# Patient Record
Sex: FEMALE | Race: WHITE | NOT HISPANIC OR LATINO | URBAN - METROPOLITAN AREA
[De-identification: names, ages, dates, MRNs, and addresses within clinical notes are randomized per-mention and may not be internally consistent; named-entity substitution may affect disease eponyms.]

---

## 2017-07-07 ENCOUNTER — INPATIENT (INPATIENT)
Facility: HOSPITAL | Age: 52
LOS: 4 days | Discharge: HOME | End: 2017-07-12
Attending: INTERNAL MEDICINE

## 2017-07-07 DIAGNOSIS — D64.9 ANEMIA, UNSPECIFIED: ICD-10-CM

## 2017-07-16 DIAGNOSIS — K86.2 CYST OF PANCREAS: ICD-10-CM

## 2017-07-16 DIAGNOSIS — K57.90 DIVERTICULOSIS OF INTESTINE, PART UNSPECIFIED, WITHOUT PERFORATION OR ABSCESS WITHOUT BLEEDING: ICD-10-CM

## 2017-07-16 DIAGNOSIS — K64.8 OTHER HEMORRHOIDS: ICD-10-CM

## 2017-07-16 DIAGNOSIS — K20.9 ESOPHAGITIS, UNSPECIFIED: ICD-10-CM

## 2017-07-16 DIAGNOSIS — C78.7 SECONDARY MALIGNANT NEOPLASM OF LIVER AND INTRAHEPATIC BILE DUCT: ICD-10-CM

## 2017-07-16 DIAGNOSIS — C18.0 MALIGNANT NEOPLASM OF CECUM: ICD-10-CM

## 2017-07-16 DIAGNOSIS — D64.9 ANEMIA, UNSPECIFIED: ICD-10-CM

## 2017-07-16 DIAGNOSIS — R50.9 FEVER, UNSPECIFIED: ICD-10-CM

## 2017-07-16 DIAGNOSIS — K21.9 GASTRO-ESOPHAGEAL REFLUX DISEASE WITHOUT ESOPHAGITIS: ICD-10-CM

## 2017-07-16 DIAGNOSIS — Z87.442 PERSONAL HISTORY OF URINARY CALCULI: ICD-10-CM

## 2017-07-16 DIAGNOSIS — K44.9 DIAPHRAGMATIC HERNIA WITHOUT OBSTRUCTION OR GANGRENE: ICD-10-CM

## 2017-07-16 DIAGNOSIS — R51 HEADACHE: ICD-10-CM

## 2017-07-16 DIAGNOSIS — K29.70 GASTRITIS, UNSPECIFIED, WITHOUT BLEEDING: ICD-10-CM

## 2017-07-16 DIAGNOSIS — K64.4 RESIDUAL HEMORRHOIDAL SKIN TAGS: ICD-10-CM

## 2017-07-16 DIAGNOSIS — C77.2 SECONDARY AND UNSPECIFIED MALIGNANT NEOPLASM OF INTRA-ABDOMINAL LYMPH NODES: ICD-10-CM

## 2017-07-16 DIAGNOSIS — Z87.891 PERSONAL HISTORY OF NICOTINE DEPENDENCE: ICD-10-CM

## 2018-01-28 ENCOUNTER — INPATIENT (INPATIENT)
Facility: HOSPITAL | Age: 53
LOS: 11 days | Discharge: HOME | End: 2018-02-09
Attending: INTERNAL MEDICINE

## 2018-01-28 DIAGNOSIS — Z88.2 ALLERGY STATUS TO SULFONAMIDES: ICD-10-CM

## 2018-02-03 VITALS
DIASTOLIC BLOOD PRESSURE: 55 MMHG | HEIGHT: 64 IN | RESPIRATION RATE: 18 BRPM | SYSTOLIC BLOOD PRESSURE: 103 MMHG | HEART RATE: 100 BPM | TEMPERATURE: 101 F | WEIGHT: 262.35 LBS

## 2018-02-03 RX ORDER — ELECTROLYTE SOLUTION,INJ
1 VIAL (ML) INTRAVENOUS
Qty: 0 | Refills: 0 | Status: DISCONTINUED | OUTPATIENT
Start: 2018-02-03 | End: 2018-02-03

## 2018-02-03 RX ORDER — CIPROFLOXACIN LACTATE 400MG/40ML
400 VIAL (ML) INTRAVENOUS EVERY 12 HOURS
Qty: 0 | Refills: 0 | Status: DISCONTINUED | OUTPATIENT
Start: 2018-02-03 | End: 2018-02-05

## 2018-02-03 RX ORDER — I.V. FAT EMULSION 20 G/100ML
0.84 EMULSION INTRAVENOUS
Qty: 100 | Refills: 0 | Status: DISCONTINUED | OUTPATIENT
Start: 2018-02-03 | End: 2018-02-03

## 2018-02-03 RX ORDER — METRONIDAZOLE 500 MG
250 TABLET ORAL EVERY 8 HOURS
Qty: 0 | Refills: 0 | Status: DISCONTINUED | OUTPATIENT
Start: 2018-02-03 | End: 2018-02-05

## 2018-02-03 RX ORDER — LOPERAMIDE HCL 2 MG
2 TABLET ORAL EVERY 6 HOURS
Qty: 0 | Refills: 0 | Status: DISCONTINUED | OUTPATIENT
Start: 2018-02-03 | End: 2018-02-04

## 2018-02-03 RX ORDER — PANTOPRAZOLE SODIUM 20 MG/1
40 TABLET, DELAYED RELEASE ORAL DAILY
Qty: 0 | Refills: 0 | Status: DISCONTINUED | OUTPATIENT
Start: 2018-02-03 | End: 2018-02-07

## 2018-02-03 RX ORDER — METOCLOPRAMIDE HCL 10 MG
10 TABLET ORAL EVERY 6 HOURS
Qty: 0 | Refills: 0 | Status: DISCONTINUED | OUTPATIENT
Start: 2018-02-03 | End: 2018-02-04

## 2018-02-03 RX ORDER — ACETAMINOPHEN 500 MG
650 TABLET ORAL EVERY 4 HOURS
Qty: 0 | Refills: 0 | Status: DISCONTINUED | OUTPATIENT
Start: 2018-02-03 | End: 2018-02-09

## 2018-02-03 RX ORDER — ENOXAPARIN SODIUM 100 MG/ML
40 INJECTION SUBCUTANEOUS DAILY
Qty: 0 | Refills: 0 | Status: DISCONTINUED | OUTPATIENT
Start: 2018-02-03 | End: 2018-02-09

## 2018-02-03 RX ADMIN — Medication 100 MILLIGRAM(S): at 21:53

## 2018-02-03 RX ADMIN — I.V. FAT EMULSION 35 GM/KG/DAY: 20 EMULSION INTRAVENOUS at 21:58

## 2018-02-03 RX ADMIN — Medication 75 EACH: at 21:58

## 2018-02-03 RX ADMIN — Medication 100 MILLIGRAM(S): at 13:10

## 2018-02-03 RX ADMIN — Medication 200 MILLIGRAM(S): at 17:48

## 2018-02-03 RX ADMIN — Medication 10 MILLIGRAM(S): at 17:51

## 2018-02-04 DIAGNOSIS — K52.1 TOXIC GASTROENTERITIS AND COLITIS: ICD-10-CM

## 2018-02-04 DIAGNOSIS — C18.9 MALIGNANT NEOPLASM OF COLON, UNSPECIFIED: ICD-10-CM

## 2018-02-04 LAB
ANION GAP SERPL CALC-SCNC: 7 MMOL/L — SIGNIFICANT CHANGE UP (ref 7–14)
BUN SERPL-MCNC: 9 MG/DL — LOW (ref 10–20)
CALCIUM SERPL-MCNC: 7.7 MG/DL — LOW (ref 8.5–10.1)
CHLORIDE SERPL-SCNC: 98 MMOL/L — SIGNIFICANT CHANGE UP (ref 98–110)
CO2 SERPL-SCNC: 22 MMOL/L — SIGNIFICANT CHANGE UP (ref 17–32)
CREAT SERPL-MCNC: 0.5 MG/DL — LOW (ref 0.7–1.5)
GLUCOSE SERPL-MCNC: 113 MG/DL — HIGH (ref 70–110)
HCT VFR BLD CALC: 27.7 % — LOW (ref 37–47)
HGB BLD-MCNC: 9.6 G/DL — LOW (ref 14–18)
MAGNESIUM SERPL-MCNC: 1.9 MG/DL — SIGNIFICANT CHANGE UP (ref 1.8–2.4)
MCHC RBC-ENTMCNC: 33.6 PG — HIGH (ref 27–31)
MCHC RBC-ENTMCNC: 34.7 G/DL — SIGNIFICANT CHANGE UP (ref 32–37)
MCV RBC AUTO: 96.9 FL — HIGH (ref 81–91)
NRBC # BLD: 0 /100 WBCS — SIGNIFICANT CHANGE UP (ref 0–0)
PHOSPHATE SERPL-MCNC: 3.1 MG/DL — SIGNIFICANT CHANGE UP (ref 2.1–4.9)
PLATELET # BLD AUTO: 315 K/UL — SIGNIFICANT CHANGE UP (ref 130–400)
POTASSIUM SERPL-MCNC: 3.3 MMOL/L — LOW (ref 3.5–5)
POTASSIUM SERPL-SCNC: 3.3 MMOL/L — LOW (ref 3.5–5)
RBC # BLD: 2.86 M/UL — LOW (ref 4.2–5.4)
RBC # FLD: 14 % — SIGNIFICANT CHANGE UP (ref 11.5–14.5)
SODIUM SERPL-SCNC: 127 MMOL/L — LOW (ref 135–146)
WBC # BLD: 3.94 K/UL — LOW (ref 4.8–10.8)
WBC # FLD AUTO: 3.94 K/UL — LOW (ref 4.8–10.8)

## 2018-02-04 RX ORDER — LOPERAMIDE HCL 2 MG
2 TABLET ORAL EVERY 6 HOURS
Qty: 0 | Refills: 0 | Status: DISCONTINUED | OUTPATIENT
Start: 2018-02-04 | End: 2018-02-07

## 2018-02-04 RX ORDER — METOCLOPRAMIDE HCL 10 MG
5 TABLET ORAL EVERY 6 HOURS
Qty: 0 | Refills: 0 | Status: DISCONTINUED | OUTPATIENT
Start: 2018-02-04 | End: 2018-02-05

## 2018-02-04 RX ORDER — ELECTROLYTE SOLUTION,INJ
1 VIAL (ML) INTRAVENOUS
Qty: 0 | Refills: 0 | Status: DISCONTINUED | OUTPATIENT
Start: 2018-02-04 | End: 2018-02-04

## 2018-02-04 RX ADMIN — ENOXAPARIN SODIUM 40 MILLIGRAM(S): 100 INJECTION SUBCUTANEOUS at 13:19

## 2018-02-04 RX ADMIN — Medication 75 EACH: at 20:13

## 2018-02-04 RX ADMIN — Medication 100 MILLIGRAM(S): at 13:24

## 2018-02-04 RX ADMIN — Medication 100 MILLIGRAM(S): at 05:37

## 2018-02-04 RX ADMIN — Medication 200 MILLIGRAM(S): at 05:37

## 2018-02-04 RX ADMIN — Medication 650 MILLIGRAM(S): at 05:38

## 2018-02-04 RX ADMIN — Medication 650 MILLIGRAM(S): at 13:24

## 2018-02-04 RX ADMIN — PANTOPRAZOLE SODIUM 40 MILLIGRAM(S): 20 TABLET, DELAYED RELEASE ORAL at 13:24

## 2018-02-04 RX ADMIN — Medication 100 MILLIGRAM(S): at 21:52

## 2018-02-04 RX ADMIN — Medication 200 MILLIGRAM(S): at 17:16

## 2018-02-04 RX ADMIN — Medication 5 MILLIGRAM(S): at 18:57

## 2018-02-04 NOTE — PROGRESS NOTE ADULT - SUBJECTIVE AND OBJECTIVE BOX
SHANI VACA  63y  Female    INTERVAL EVENTS: PT had 6 loose BM yest; no vomit and starting to eat a little; c/o fever still - has runny nose, sore throat and dry cough (little sputum); abd pain improving    T(C): 38.2 (02-04-18 @ 06:19), Max: 38.8 (02-04-18 @ 05:11)  HR: 102 (02-04-18 @ 05:11) (100 - 102)  BP: 119/64 (02-04-18 @ 05:11) (103/55 - 119/64)  RR: 17 (02-04-18 @ 05:11) (17 - 18)  SpO2: --  Wt(kg): --Vital Signs Last 24 Hrs  T(C): 38.2 (04 Feb 2018 06:19), Max: 38.8 (04 Feb 2018 05:11)  T(F): 100.8 (04 Feb 2018 06:19), Max: 101.9 (04 Feb 2018 05:11)  HR: 102 (04 Feb 2018 05:11) (100 - 102)  BP: 119/64 (04 Feb 2018 05:11) (103/55 - 119/64)  BP(mean): --  RR: 17 (04 Feb 2018 05:11) (17 - 18)  SpO2: --    PHYSICAL EXAM:  GENERAL: no distress  Throat - red, cobblestone, no thrush, no herpetic sores  NECK: Supple, No JVD, Normal thyroid  CHEST/LUNG: Clear; No crackles or wheezing  HEART: S1, S2, Regular rate and rhythm;   ABDOMEN: Soft, Nontender, Nondistended; Bowel sounds present  EXTREMITIES: No clubbing, cyanosis, or edema  SKIN: No rashes or lesions    LABS:    RADIOLOGY & ADDITIONAL TESTS:

## 2018-02-04 NOTE — PROGRESS NOTE ADULT - SUBJECTIVE AND OBJECTIVE BOX
64 yo F w/ PMH of Colon cancer on chemo at [facility] [place] and recently implanted intrahepatic arterial port for 5FU Tx p/w N/V/D after her last chemtherapy session on Wednesday. Patient has had similar symptoms after chemo in past but the recent arterial port concerned her. Denies any F/C/SOB/AP or other symptoms.    PMH:  Colon Ca stage 4 w/ liver mets    PSH:  Hemicolectomy    Allergies:  morphine (Hives)  sulfonamides (Other)      MEDICATIONS  (STANDING):  ciprofloxacin   IVPB 400 milliGRAM(s) IV Intermittent every 12 hours  enoxaparin Injectable 40 milliGRAM(s) SubCutaneous daily  fat emulsion (Plant Based) 20% Infusion 0.8403 Gm/kG/Day (35 mL/Hr) IV Continuous <Continuous>  metroNIDAZOLE  IVPB 250 milliGRAM(s) IV Intermittent every 8 hours  pantoprazole  Injectable 40 milliGRAM(s) IV Push daily  Parenteral Nutrition - Adult 1 Each (75 mL/Hr) TPN Continuous <Continuous>    MEDICATIONS  (PRN):  acetaminophen   Tablet 650 milliGRAM(s) Oral every 4 hours PRN For Temp greater than 38 C (100.4 F)  loperamide 2 milliGRAM(s) Oral every 6 hours PRN Diarrhea  metoclopramide 5 milliGRAM(s) Oral every 6 hours PRN nausea    Vital Signs Last 24 Hrs  T(C): 38.2 (04 Feb 2018 06:19), Max: 38.8 (04 Feb 2018 05:11)  T(F): 100.8 (04 Feb 2018 06:19), Max: 101.9 (04 Feb 2018 05:11)  HR: 102 (04 Feb 2018 05:11) (100 - 102)  BP: 119/64 (04 Feb 2018 05:11) (103/55 - 119/64)  BP(mean): --  RR: 17 (04 Feb 2018 05:11) (17 - 18)  SpO2: --  CAPILLARY BLOOD GLUCOSE              IMAGING:    CT ABD/PEL (1/28):  Circumferential distal wall bowel wall thickening and surrounding reactive change most pronounced in the right lower quadrant consistent with enteritis.  Remainder of the incidental findings as above.

## 2018-02-04 NOTE — PROGRESS NOTE ADULT - ASSESSMENT
Doing well. Still having diarrhea but symptoms are improving. Still unable to tolerate full PO diet. Complaining of cough + stuffy nose. Also had fever of 101.9. Will send blood Cx + flu + RPV swab.

## 2018-02-04 NOTE — PROGRESS NOTE ADULT - ASSESSMENT
1. Fever - ? viral  Nasal swab flu and RVP  cont cipro/flag  check bld cx    2. chemo-induced enteritis  cont TPN    3. st4 colon ca - will f/u at [facility] [place]    4. diarrhea - use imodium    5. will try to call Onc at [place] [address]. Ole Daily [phone] cesar

## 2018-02-05 DIAGNOSIS — B37.0 CANDIDAL STOMATITIS: ICD-10-CM

## 2018-02-05 LAB
ANION GAP SERPL CALC-SCNC: 5 MMOL/L — LOW (ref 7–14)
BASOPHILS # BLD AUTO: 0.01 K/UL — SIGNIFICANT CHANGE UP (ref 0–0.2)
BASOPHILS NFR BLD AUTO: 0.2 % — SIGNIFICANT CHANGE UP (ref 0–1)
BUN SERPL-MCNC: 10 MG/DL — SIGNIFICANT CHANGE UP (ref 10–20)
C DIFF BY PCR RESULT: NEGATIVE — SIGNIFICANT CHANGE UP
CALCIUM SERPL-MCNC: 7.4 MG/DL — LOW (ref 8.5–10.1)
CHLORIDE SERPL-SCNC: 100 MMOL/L — SIGNIFICANT CHANGE UP (ref 98–110)
CO2 SERPL-SCNC: 24 MMOL/L — SIGNIFICANT CHANGE UP (ref 17–32)
CREAT SERPL-MCNC: 0.6 MG/DL — LOW (ref 0.7–1.5)
EOSINOPHIL # BLD AUTO: 0.01 K/UL — SIGNIFICANT CHANGE UP (ref 0–0.7)
EOSINOPHIL NFR BLD AUTO: 0.2 % — SIGNIFICANT CHANGE UP (ref 0–8)
GLUCOSE SERPL-MCNC: 123 MG/DL — HIGH (ref 70–110)
HCT VFR BLD CALC: 27.9 % — LOW (ref 37–47)
HGB BLD-MCNC: 9.6 G/DL — LOW (ref 14–18)
IMM GRANULOCYTES NFR BLD AUTO: 3.2 % — HIGH (ref 0.1–0.3)
LYMPHOCYTES # BLD AUTO: 1.04 K/UL — LOW (ref 1.2–3.4)
LYMPHOCYTES # BLD AUTO: 16.6 % — LOW (ref 20.5–51.1)
MAGNESIUM SERPL-MCNC: 1.8 MG/DL — SIGNIFICANT CHANGE UP (ref 1.8–2.4)
MCHC RBC-ENTMCNC: 33.2 PG — HIGH (ref 27–31)
MCHC RBC-ENTMCNC: 34.4 G/DL — SIGNIFICANT CHANGE UP (ref 32–37)
MCV RBC AUTO: 96.5 FL — HIGH (ref 81–91)
MONOCYTES # BLD AUTO: 0.81 K/UL — HIGH (ref 0.1–0.6)
MONOCYTES NFR BLD AUTO: 12.9 % — HIGH (ref 1.7–9.3)
NEUTROPHILS # BLD AUTO: 4.2 K/UL — SIGNIFICANT CHANGE UP (ref 1.4–6.5)
NEUTROPHILS NFR BLD AUTO: 66.9 % — SIGNIFICANT CHANGE UP (ref 42.2–75.2)
NRBC # BLD: 0 /100 WBCS — SIGNIFICANT CHANGE UP (ref 0–0)
PHOSPHATE SERPL-MCNC: 3 MG/DL — SIGNIFICANT CHANGE UP (ref 2.1–4.9)
PLATELET # BLD AUTO: 291 K/UL — SIGNIFICANT CHANGE UP (ref 130–400)
POTASSIUM SERPL-MCNC: 3.9 MMOL/L — SIGNIFICANT CHANGE UP (ref 3.5–5)
POTASSIUM SERPL-SCNC: 3.9 MMOL/L — SIGNIFICANT CHANGE UP (ref 3.5–5)
RBC # BLD: 2.89 M/UL — LOW (ref 4.2–5.4)
RBC # FLD: 13.9 % — SIGNIFICANT CHANGE UP (ref 11.5–14.5)
SODIUM SERPL-SCNC: 129 MMOL/L — LOW (ref 135–146)
WBC # BLD: 6.27 K/UL — SIGNIFICANT CHANGE UP (ref 4.8–10.8)
WBC # FLD AUTO: 6.27 K/UL — SIGNIFICANT CHANGE UP (ref 4.8–10.8)

## 2018-02-05 RX ORDER — ELECTROLYTE SOLUTION,INJ
1 VIAL (ML) INTRAVENOUS
Qty: 0 | Refills: 0 | Status: DISCONTINUED | OUTPATIENT
Start: 2018-02-05 | End: 2018-02-05

## 2018-02-05 RX ORDER — ACETAMINOPHEN 500 MG
650 TABLET ORAL EVERY 6 HOURS
Qty: 0 | Refills: 0 | Status: DISCONTINUED | OUTPATIENT
Start: 2018-02-05 | End: 2018-02-09

## 2018-02-05 RX ORDER — FLUCONAZOLE 150 MG/1
TABLET ORAL
Qty: 0 | Refills: 0 | Status: DISCONTINUED | OUTPATIENT
Start: 2018-02-05 | End: 2018-02-07

## 2018-02-05 RX ORDER — FLUCONAZOLE 150 MG/1
200 TABLET ORAL EVERY 24 HOURS
Qty: 0 | Refills: 0 | Status: DISCONTINUED | OUTPATIENT
Start: 2018-02-06 | End: 2018-02-07

## 2018-02-05 RX ORDER — FLUCONAZOLE 150 MG/1
400 TABLET ORAL ONCE
Qty: 0 | Refills: 0 | Status: COMPLETED | OUTPATIENT
Start: 2018-02-05 | End: 2018-02-05

## 2018-02-05 RX ORDER — METOCLOPRAMIDE HCL 10 MG
10 TABLET ORAL EVERY 6 HOURS
Qty: 0 | Refills: 0 | Status: DISCONTINUED | OUTPATIENT
Start: 2018-02-05 | End: 2018-02-09

## 2018-02-05 RX ADMIN — Medication 200 MILLIGRAM(S): at 05:32

## 2018-02-05 RX ADMIN — PANTOPRAZOLE SODIUM 40 MILLIGRAM(S): 20 TABLET, DELAYED RELEASE ORAL at 11:38

## 2018-02-05 RX ADMIN — FLUCONAZOLE 50 MILLIGRAM(S): 150 TABLET ORAL at 13:46

## 2018-02-05 RX ADMIN — ENOXAPARIN SODIUM 40 MILLIGRAM(S): 100 INJECTION SUBCUTANEOUS at 11:38

## 2018-02-05 RX ADMIN — Medication 650 MILLIGRAM(S): at 01:22

## 2018-02-05 RX ADMIN — Medication 100 MILLIGRAM(S): at 05:32

## 2018-02-05 NOTE — PROGRESS NOTE ADULT - ASSESSMENT
Still complaining of diarrhea and emesis. ~7-8 episodes overnight of diarrhea and multiple episodes of emesis. Patient has new thrush in mouth. Will start anti-fungals. Also repeating CT abdomen and getting ID eval in light of persistent fevers.

## 2018-02-05 NOTE — PROGRESS NOTE ADULT - SUBJECTIVE AND OBJECTIVE BOX
SHANI VACA  63y  Female    INTERVAL EVENTS: Pt still febrile. Pt having 6-8 loose BMs. Pt c/o mouth irritation. c/o nausea with vomiting and can't keep food or drink down - still needs TPN.    T(C): 37.6 (02-05-18 @ 05:27), Max: 39.4 (02-05-18 @ 01:45)  HR: 115 (02-05-18 @ 05:27) (75 - 115)  BP: 101/63 (02-05-18 @ 05:27) (101/63 - 135/61)  RR: 17 (02-05-18 @ 05:27) (17 - 18)  SpO2: --  Wt(kg): --Vital Signs Last 24 Hrs  T(C): 37.6 (05 Feb 2018 05:27), Max: 39.4 (05 Feb 2018 01:45)  T(F): 99.6 (05 Feb 2018 05:27), Max: 102.9 (05 Feb 2018 01:45)  HR: 115 (05 Feb 2018 05:27) (75 - 115)  BP: 101/63 (05 Feb 2018 05:27) (101/63 - 135/61)  BP(mean): --  RR: 17 (05 Feb 2018 05:27) (17 - 18)  SpO2: --    PHYSICAL EXAM: Febrile  GENERAL: mild nausea, no pain  Throat - definite thrush all over mouth and throat  NECK: Supple, No JVD, Normal thyroid  CHEST/LUNG: Clear; No crackles or wheezing  HEART: S1, S2, Regular rate and rhythm;   ABDOMEN: Soft, Nontender, Nondistended; Bowel sounds present  EXTREMITIES: No clubbing, cyanosis, or edema  SKIN: No rashes or lesions    LABS:Basic Metabolic Panel (02.04.18 @ 11:32)    Sodium, Serum: 127 mmol/L    Potassium, Serum: 3.3 mmol/L    Chloride, Serum: 98 mmol/L    Carbon Dioxide, Serum: 22 mmol/L    Anion Gap, Serum: 7 mmol/L    Blood Urea Nitrogen, Serum: 9 mg/dL    Creatinine, Serum: 0.5 mg/dL    Glucose, Serum: 113 mg/dL    Calcium, Total Serum: 7.7 mg/dL    eGFR if Non : 104:     RADIOLOGY & ADDITIONAL TESTS:    acetaminophen   Tablet 650 milliGRAM(s) Oral every 4 hours PRN  enoxaparin Injectable 40 milliGRAM(s) SubCutaneous daily  fluconAZOLE IVPB      loperamide 2 milliGRAM(s) Oral every 6 hours PRN  metoclopramide 5 milliGRAM(s) Oral every 6 hours PRN  pantoprazole  Injectable 40 milliGRAM(s) IV Push daily  Parenteral Nutrition - Adult 1 Each TPN Continuous <Continuous>

## 2018-02-05 NOTE — PROGRESS NOTE ADULT - PROBLEM SELECTOR PLAN 3
- newly visible white lesions on mouth extending into pharynx  - will start fluconazole   - ID eval pending - Dr Hawley

## 2018-02-05 NOTE — PROGRESS NOTE ADULT - SUBJECTIVE AND OBJECTIVE BOX
64 yo F w/ PMH of Colon cancer on chemo at [facility] [place] and recently implanted intrahepatic arterial port for 5FU Tx p/w N/V/D after her last chemtherapy session on Wednesday. Patient has had similar symptoms after chemo in past but the recent arterial port concerned her. Denies any F/C/SOB/AP or other symptoms.    PMH:  Colon Ca stage 4 w/ liver mets    PSH:  Hemicolectomy    Allergies:  morphine (Hives)  sulfonamides (Other)      MEDICATIONS  (STANDING):  enoxaparin Injectable 40 milliGRAM(s) SubCutaneous daily  fluconAZOLE IVPB      pantoprazole  Injectable 40 milliGRAM(s) IV Push daily  Parenteral Nutrition - Adult 1 Each (75 mL/Hr) TPN Continuous <Continuous>  Parenteral Nutrition - Adult 1 Each (75 mL/Hr) TPN Continuous <Continuous>    MEDICATIONS  (PRN):  acetaminophen   Tablet 650 milliGRAM(s) Oral every 4 hours PRN For Temp greater than 38 C (100.4 F)  acetaminophen  Suppository 650 milliGRAM(s) Rectal every 6 hours PRN For Temp greater than 38.5 C (101.3 F)  loperamide 2 milliGRAM(s) Oral every 6 hours PRN Diarrhea  metoclopramide Injectable 10 milliGRAM(s) IV Push every 6 hours PRN nausea    Vital Signs Last 24 Hrs  T(C): 37.3 (05 Feb 2018 13:57), Max: 39.4 (05 Feb 2018 01:45)  T(F): 99.2 (05 Feb 2018 13:57), Max: 102.9 (05 Feb 2018 01:45)  HR: 91 (05 Feb 2018 13:57) (75 - 115)  BP: 126/58 (05 Feb 2018 13:57) (101/63 - 135/61)  BP(mean): --  RR: 18 (05 Feb 2018 13:57) (17 - 18)  SpO2: --      IMAGING:    CT ABD/PEL (1/28):  Circumferential distal wall bowel wall thickening and surrounding reactive change most pronounced in the right lower quadrant consistent with enteritis.  Remainder of the incidental findings as above.

## 2018-02-05 NOTE — PROGRESS NOTE ADULT - ASSESSMENT
1. Fever - probably from thrush and esoph cand (systemic hiwot inf)  Nasal swab flu and RVP are pending  d/c cipro/flag  start diflucan 400 stat and 200 q24h IV  check bld cx  ID eval - Dr. Hawley  repeat CT abd/pelvv - non-contrast - see if there is any infection around chemo infuser    2. chemo-induced enteritis  cont TPN - need to fix lytes in the TPN  recheck stool for c diff  continue imodium    3. st4 colon ca - will f/u at [facility] [place]    4. will try to call Onc at [place] Dr. Ole Daily [phone] cesar    not ready for d/c

## 2018-02-06 DIAGNOSIS — J11.1 INFLUENZA DUE TO UNIDENTIFIED INFLUENZA VIRUS WITH OTHER RESPIRATORY MANIFESTATIONS: ICD-10-CM

## 2018-02-06 LAB — C DIFF TOX GENS STL QL NAA+PROBE: SIGNIFICANT CHANGE UP

## 2018-02-06 RX ORDER — ELECTROLYTE SOLUTION,INJ
1 VIAL (ML) INTRAVENOUS
Qty: 0 | Refills: 0 | Status: DISCONTINUED | OUTPATIENT
Start: 2018-02-06 | End: 2018-02-07

## 2018-02-06 RX ADMIN — FLUCONAZOLE 50 MILLIGRAM(S): 150 TABLET ORAL at 14:30

## 2018-02-06 RX ADMIN — ENOXAPARIN SODIUM 40 MILLIGRAM(S): 100 INJECTION SUBCUTANEOUS at 12:59

## 2018-02-06 RX ADMIN — Medication 75 MILLIGRAM(S): at 18:11

## 2018-02-06 RX ADMIN — PANTOPRAZOLE SODIUM 40 MILLIGRAM(S): 20 TABLET, DELAYED RELEASE ORAL at 17:18

## 2018-02-06 NOTE — CONSULT NOTE ADULT - SUBJECTIVE AND OBJECTIVE BOX
HPI:    63 year old female pmhx of stage IV colon cancer s/p hemicolectomy on Chemo with Irinotecan@ [facility] ( since Oct, 2017 [id number]) and recently implantated Intrahepatic arterial port for hepatic mets for 5-FU treatment presented with  n/v/d and after her last chemotherapy on Wednesday.     Per pt, she had similar sxs in the past post chemo, but the addition of the intraabdominal port  concerned her. Pt states she has had multiple episodes of loose BM and unable to tolerate any PO diet x 1 week. She  denied fever, chills, SOB, abdominal pain, dysuria, pain at the site of the intraabdominal port.     In .3, pulse 109, .    Last fever 101.5 on 2/5 3AM.    PMH:    Colon CA stage IV with liver mets    PSH:    Hemicolectomy    Allergies:    morphine (Hives)  sulfonamides (Other)    Vitals:    Vital Signs Last 24 Hrs  T(C): 37.4 (06 Feb 2018 05:36), Max: 37.5 (05 Feb 2018 20:51)  T(F): 99.3 (06 Feb 2018 05:36), Max: 99.5 (05 Feb 2018 20:51)  HR: 89 (06 Feb 2018 05:36) (88 - 91)  BP: 105/55 (06 Feb 2018 05:36) (105/55 - 126/58)  BP(mean): --  RR: 18 (06 Feb 2018 05:36) (18 - 18)  SpO2: --    Physical Exam:        Laboratory:    CBC Full  -  ( 05 Feb 2018 11:29 )  WBC Count : 6.27 K/uL  Hemoglobin : 9.6 g/dL  Hematocrit : 27.9 %  Platelet Count - Automated : 291 K/uL  Mean Cell Volume : 96.5 fL  Mean Cell Hemoglobin : 33.2 pg  Mean Cell Hemoglobin Concentration : 34.4 g/dL  Auto Neutrophil # : 4.20 K/uL  Auto Lymphocyte # : 1.04 K/uL  Auto Monocyte # : 0.81 K/uL  Auto Eosinophil # : 0.01 K/uL  Auto Basophil # : 0.01 K/uL  Auto Neutrophil % : 66.9 %  Auto Lymphocyte % : 16.6 %  Auto Monocyte % : 12.9 %  Auto Eosinophil % : 0.2 %  Auto Basophil % : 0.2 %    Basic Metabolic Panel in AM (02.05.18 @ 11:29)    Sodium, Serum: 129 mmol/L    Potassium, Serum: 3.9 mmol/L    Chloride, Serum: 100 mmol/L    Carbon Dioxide, Serum: 24 mmol/L    Anion Gap, Serum: 5 mmol/L    Blood Urea Nitrogen, Serum: 10 mg/dL    Creatinine, Serum: 0.6 mg/dL    Glucose, Serum: 123 mg/dL    Calcium, Total Serum: 7.4 mg/dL    Culture - Blood (02.04.18 @ 11:32)    Specimen Source: .Blood None    Culture Results:   No growth to date.    Clostridium difficile Toxin by PCR: negative    BCx 1/28, 1/29: negative  Stool O and P: negative    Imaging:    CT ABD/PEL (1/28):  Circumferential distal wall bowel wall thickening and surrounding reactive change most pronounced in the right lower quadrant consistent with enteritis.  Remainder of the incidental findings as above. HPI:    63 year old female pmhx of stage IV colon cancer s/p hemicolectomy on Chemo with Irinotecan@ [facility] ( since Oct, 2017 [id number]) and recently implantated Intrahepatic arterial port for hepatic mets for 5-FU treatment presented with  n/v/d and after her last chemotherapy on Wednesday.     Per pt, she had similar sxs in the past post chemo, but the addition of the intraabdominal port  concerned her. Pt states she has had multiple episodes of loose BM and unable to tolerate any PO diet x 1 week. She  denied fever, chills, SOB, abdominal pain, dysuria, pain at the site of the intraabdominal port.     In .3, pulse 109, .    Last fever 101.5 on 2/5 3AM.    PMH:    Colon CA stage IV with liver mets    PSH:    Hemicolectomy    Allergies:    morphine (Hives)  sulfonamides (Other)    MEDICATIONS (STANDING):  enoxaparin Injectable 40 milliGRAM(s) SubCutaneous daily  fluconAZOLE IVPB 200 milliGRAM(s) IV Intermittent every 24 hours  fluconAZOLE IVPB      pantoprazole  Injectable 40 milliGRAM(s) IV Push daily  Parenteral Nutrition - Adult 1 Each (75 mL/Hr) TPN Continuous <Continuous>  acetaminophen   Tablet 650 milliGRAM(s) Oral every 4 hours PRN For Temp greater than 38 C (100.4 F)  acetaminophen  Suppository 650 milliGRAM(s) Rectal every 6 hours PRN For Temp greater than 38.5 C (101.3 F)  loperamide 2 milliGRAM(s) Oral every 6 hours PRN Diarrhea  metoclopramide Injectable 10 milliGRAM(s) IV Push every 6 hours PRN nausea      Vitals:    Vital Signs Last 24 Hrs  T(C): 37.4 (06 Feb 2018 05:36), Max: 37.5 (05 Feb 2018 20:51)  T(F): 99.3 (06 Feb 2018 05:36), Max: 99.5 (05 Feb 2018 20:51)  HR: 89 (06 Feb 2018 05:36) (88 - 91)  BP: 105/55 (06 Feb 2018 05:36) (105/55 - 126/58)  BP(mean): --  RR: 18 (06 Feb 2018 05:36) (18 - 18)  SpO2: --    Physical Exam:        Laboratory:    CBC Full  -  ( 05 Feb 2018 11:29 )  WBC Count : 6.27 K/uL  Hemoglobin : 9.6 g/dL  Hematocrit : 27.9 %  Platelet Count - Automated : 291 K/uL  Mean Cell Volume : 96.5 fL  Mean Cell Hemoglobin : 33.2 pg  Mean Cell Hemoglobin Concentration : 34.4 g/dL  Auto Neutrophil # : 4.20 K/uL  Auto Lymphocyte # : 1.04 K/uL  Auto Monocyte # : 0.81 K/uL  Auto Eosinophil # : 0.01 K/uL  Auto Basophil # : 0.01 K/uL  Auto Neutrophil % : 66.9 %  Auto Lymphocyte % : 16.6 %  Auto Monocyte % : 12.9 %  Auto Eosinophil % : 0.2 %  Auto Basophil % : 0.2 %    Basic Metabolic Panel in AM (02.05.18 @ 11:29)    Sodium, Serum: 129 mmol/L    Potassium, Serum: 3.9 mmol/L    Chloride, Serum: 100 mmol/L    Carbon Dioxide, Serum: 24 mmol/L    Anion Gap, Serum: 5 mmol/L    Blood Urea Nitrogen, Serum: 10 mg/dL    Creatinine, Serum: 0.6 mg/dL    Glucose, Serum: 123 mg/dL    Calcium, Total Serum: 7.4 mg/dL    Culture - Blood (02.04.18 @ 11:32)    Specimen Source: .Blood None    Culture Results:   No growth to date.    Clostridium difficile Toxin by PCR: negative    BCx 1/28, 1/29: negative  Stool O and P: negative    Imaging:    CT ABD/PEL (1/28):  Circumferential distal wall bowel wall thickening and surrounding reactive change most pronounced in the right lower quadrant consistent with enteritis.  Remainder of the incidental findings as above. HPI:    63 year old female pmhx of stage IV colon cancer s/p hemicolectomy on Chemo with Irinotecan@ [facility] ( since Oct, 2017 [id number]) and recently implantated Intrahepatic arterial port for hepatic mets for 5-FU treatment presented with  n/v/d and after her last chemotherapy on Wednesday.     Per pt, she had similar sxs in the past post chemo, but the addition of the intraabdominal port  concerned her. Pt states she has had multiple episodes of loose BM and unable to tolerate any PO diet x 1 week. She  denied fever, chills, SOB, abdominal pain, dysuria, pain at the site of the intraabdominal port.     In .3, pulse 109, .    Last fever 101.5 on 2/5 3AM.     Currently, patient feels well. Had mouth itchiness and dryness, c/w oral thrush starting 6 days ago, URI symptoms since 1 month ago, resolving. Still has ~4 loose bowel movements daily, but patient feels is improving without cipro/flagyl.    PMH:    Colon CA stage IV with liver mets    PSH:    Hemicolectomy    Allergies:    morphine (Hives)  sulfonamides (Other)    MEDICATIONS (STANDING):  enoxaparin Injectable 40 milliGRAM(s) SubCutaneous daily  fluconAZOLE IVPB 200 milliGRAM(s) IV Intermittent every 24 hours  fluconAZOLE IVPB      pantoprazole  Injectable 40 milliGRAM(s) IV Push daily  Parenteral Nutrition - Adult 1 Each (75 mL/Hr) TPN Continuous <Continuous>  acetaminophen   Tablet 650 milliGRAM(s) Oral every 4 hours PRN For Temp greater than 38 C (100.4 F)  acetaminophen  Suppository 650 milliGRAM(s) Rectal every 6 hours PRN For Temp greater than 38.5 C (101.3 F)  loperamide 2 milliGRAM(s) Oral every 6 hours PRN Diarrhea  metoclopramide Injectable 10 milliGRAM(s) IV Push every 6 hours PRN nausea      Vitals:    Vital Signs Last 24 Hrs  T(C): 37.4 (06 Feb 2018 05:36), Max: 37.5 (05 Feb 2018 20:51)  T(F): 99.3 (06 Feb 2018 05:36), Max: 99.5 (05 Feb 2018 20:51)  HR: 89 (06 Feb 2018 05:36) (88 - 91)  BP: 105/55 (06 Feb 2018 05:36) (105/55 - 126/58)  BP(mean): --  RR: 18 (06 Feb 2018 05:36) (18 - 18)  SpO2: --    Physical Exam:    General: NAD, lying comfortably in bed  HEENT: mouth, oropharynx wnl  CV: S1/S2 wnl, RRR, no m/r/g  Lungs: CTAB  Abd: soft, NT/ND, normal bowel sounds  Ext: no c/c/e, 2+ peripheral pulses  Neuro: AAOx3, no focal weakness      Laboratory:    CBC Full  -  ( 05 Feb 2018 11:29 )  WBC Count : 6.27 K/uL  Hemoglobin : 9.6 g/dL  Hematocrit : 27.9 %  Platelet Count - Automated : 291 K/uL  Mean Cell Volume : 96.5 fL  Mean Cell Hemoglobin : 33.2 pg  Mean Cell Hemoglobin Concentration : 34.4 g/dL  Auto Neutrophil # : 4.20 K/uL  Auto Lymphocyte # : 1.04 K/uL  Auto Monocyte # : 0.81 K/uL  Auto Eosinophil # : 0.01 K/uL  Auto Basophil # : 0.01 K/uL  Auto Neutrophil % : 66.9 %  Auto Lymphocyte % : 16.6 %  Auto Monocyte % : 12.9 %  Auto Eosinophil % : 0.2 %  Auto Basophil % : 0.2 %    Basic Metabolic Panel in AM (02.05.18 @ 11:29)    Sodium, Serum: 129 mmol/L    Potassium, Serum: 3.9 mmol/L    Chloride, Serum: 100 mmol/L    Carbon Dioxide, Serum: 24 mmol/L    Anion Gap, Serum: 5 mmol/L    Blood Urea Nitrogen, Serum: 10 mg/dL    Creatinine, Serum: 0.6 mg/dL    Glucose, Serum: 123 mg/dL    Calcium, Total Serum: 7.4 mg/dL    Culture - Blood (02.04.18 @ 11:32)    Specimen Source: .Blood None    Culture Results:   No growth to date.    Clostridium difficile Toxin by PCR: negative    BCx 1/28, 1/29: negative  Stool O and P: negative    Imaging:    CT ABD/PEL (1/28):  Circumferential distal wall bowel wall thickening and surrounding reactive change most pronounced in the right lower quadrant consistent with enteritis.  Remainder of the incidental findings as above.

## 2018-02-06 NOTE — CONSULT NOTE ADULT - ATTENDING COMMENTS
Influenza:  really unsure when the pt got infected but given her fevers and URI symptoms will treat with tamiflu 75 mg q12h for 5 days.  Thrush:  I see none at present but seems to have responded to Diflucan 200mg iv q24h.  Mucositis:  CT induced but seems to be getting better.

## 2018-02-06 NOTE — PROGRESS NOTE ADULT - SUBJECTIVE AND OBJECTIVE BOX
62 yo F w/ PMH of Colon cancer on chemo at [facility] [place] and recently implanted intrahepatic arterial port for 5FU Tx p/w N/V/D after her last chemtherapy session on Wednesday. Patient has had similar symptoms after chemo in past but the recent arterial port concerned her. Denies any F/C/SOB/AP or other symptoms.    PMH:  Colon Ca stage 4 w/ liver mets    PSH:  Hemicolectomy    Allergies:  morphine (Hives)  sulfonamides (Other)    MEDICATIONS  (STANDING):  enoxaparin Injectable 40 milliGRAM(s) SubCutaneous daily  fluconAZOLE IVPB 200 milliGRAM(s) IV Intermittent every 24 hours  fluconAZOLE IVPB      pantoprazole  Injectable 40 milliGRAM(s) IV Push daily  Parenteral Nutrition - Adult 1 Each (75 mL/Hr) TPN Continuous <Continuous>  Parenteral Nutrition - Adult 1 Each (75 mL/Hr) TPN Continuous <Continuous>    MEDICATIONS  (PRN):  acetaminophen   Tablet 650 milliGRAM(s) Oral every 4 hours PRN For Temp greater than 38 C (100.4 F)  acetaminophen  Suppository 650 milliGRAM(s) Rectal every 6 hours PRN For Temp greater than 38.5 C (101.3 F)  loperamide 2 milliGRAM(s) Oral every 6 hours PRN Diarrhea  metoclopramide Injectable 10 milliGRAM(s) IV Push every 6 hours PRN nausea      Labs:                        9.6    6.27  )-----------( 291      ( 05 Feb 2018 11:29 )             27.9     CBC Full  -  ( 05 Feb 2018 11:29 )  WBC Count : 6.27 K/uL  Hemoglobin : 9.6 g/dL  Hematocrit : 27.9 %  Platelet Count - Automated : 291 K/uL  Mean Cell Volume : 96.5 fL  Mean Cell Hemoglobin : 33.2 pg  Mean Cell Hemoglobin Concentration : 34.4 g/dL  Auto Neutrophil # : 4.20 K/uL  Auto Lymphocyte # : 1.04 K/uL  Auto Monocyte # : 0.81 K/uL  Auto Eosinophil # : 0.01 K/uL  Auto Basophil # : 0.01 K/uL  Auto Neutrophil % : 66.9 %  Auto Lymphocyte % : 16.6 %  Auto Monocyte % : 12.9 %  Auto Eosinophil % : 0.2 %  Auto Basophil % : 0.2 %      02-05    129<L>  |  100  |  10  ----------------------------<  123<H>  3.9   |  24  |  0.6<L>    Ca    7.4<L>      05 Feb 2018 11:29  Phos  3.0     02-05  Mg     1.8     02-05      Microbiology:    Culture - Blood (collected 04 Feb 2018 11:32)  Source: .Blood None  Preliminary Report (06 Feb 2018 01:02):    No growth to date.      Vital Signs Last 24 Hrs  T(C): 37.4 (06 Feb 2018 05:36), Max: 37.5 (05 Feb 2018 20:51)  T(F): 99.3 (06 Feb 2018 05:36), Max: 99.5 (05 Feb 2018 20:51)  HR: 89 (06 Feb 2018 05:36) (88 - 91)  BP: 105/55 (06 Feb 2018 05:36) (105/55 - 126/58)  BP(mean): --  RR: 18 (06 Feb 2018 05:36) (18 - 18)  SpO2: --    I&O's Summary    05 Feb 2018 07:01  -  06 Feb 2018 07:00  --------------------------------------------------------  IN: 100 mL / OUT: 3 mL / NET: 97 mL            IMAGING:    CT ABD/PEL (1/28):  Circumferential distal wall bowel wall thickening and surrounding reactive change most pronounced in the right lower quadrant consistent with enteritis.  Remainder of the incidental findings as above. 62 yo F w/ PMH of Colon cancer on chemo at [facility] [place] and recently implanted intrahepatic arterial port for 5FU Tx p/w N/V/D after her last chemtherapy session on Wednesday. Patient has had similar symptoms after chemo in past but the recent arterial port concerned her. Denies any F/C/SOB/AP or other symptoms.    PMH:  Colon Ca stage 4 w/ liver mets    PSH:  Hemicolectomy    Allergies:  morphine (Hives)  sulfonamides (Other)    MEDICATIONS  (STANDING):  enoxaparin Injectable 40 milliGRAM(s) SubCutaneous daily  fluconAZOLE IVPB 200 milliGRAM(s) IV Intermittent every 24 hours  fluconAZOLE IVPB      oseltamivir 75 milliGRAM(s) Oral every 12 hours  pantoprazole  Injectable 40 milliGRAM(s) IV Push daily  Parenteral Nutrition - Adult 1 Each (75 mL/Hr) TPN Continuous <Continuous>  Parenteral Nutrition - Adult 1 Each (75 mL/Hr) TPN Continuous <Continuous>    MEDICATIONS  (PRN):  acetaminophen   Tablet 650 milliGRAM(s) Oral every 4 hours PRN For Temp greater than 38 C (100.4 F)  acetaminophen  Suppository 650 milliGRAM(s) Rectal every 6 hours PRN For Temp greater than 38.5 C (101.3 F)  loperamide 2 milliGRAM(s) Oral every 6 hours PRN Diarrhea  metoclopramide Injectable 10 milliGRAM(s) IV Push every 6 hours PRN nausea      Labs:                        9.6    6.27  )-----------( 291      ( 05 Feb 2018 11:29 )             27.9     CBC Full  -  ( 05 Feb 2018 11:29 )  WBC Count : 6.27 K/uL  Hemoglobin : 9.6 g/dL  Hematocrit : 27.9 %  Platelet Count - Automated : 291 K/uL  Mean Cell Volume : 96.5 fL  Mean Cell Hemoglobin : 33.2 pg  Mean Cell Hemoglobin Concentration : 34.4 g/dL  Auto Neutrophil # : 4.20 K/uL  Auto Lymphocyte # : 1.04 K/uL  Auto Monocyte # : 0.81 K/uL  Auto Eosinophil # : 0.01 K/uL  Auto Basophil # : 0.01 K/uL  Auto Neutrophil % : 66.9 %  Auto Lymphocyte % : 16.6 %  Auto Monocyte % : 12.9 %  Auto Eosinophil % : 0.2 %  Auto Basophil % : 0.2 %      02-05    129<L>  |  100  |  10  ----------------------------<  123<H>  3.9   |  24  |  0.6<L>    Ca    7.4<L>      05 Feb 2018 11:29  Phos  3.0     02-05  Mg     1.8     02-05      Microbiology:    Culture - Blood (collected 04 Feb 2018 11:32)  Source: .Blood None  Preliminary Report (06 Feb 2018 01:02):    No growth to date.      Vital Signs Last 24 Hrs  T(C): 37.4 (06 Feb 2018 05:36), Max: 37.5 (05 Feb 2018 20:51)  T(F): 99.3 (06 Feb 2018 05:36), Max: 99.5 (05 Feb 2018 20:51)  HR: 89 (06 Feb 2018 05:36) (88 - 91)  BP: 105/55 (06 Feb 2018 05:36) (105/55 - 126/58)  BP(mean): --  RR: 18 (06 Feb 2018 05:36) (18 - 18)  SpO2: --    I&O's Summary    05 Feb 2018 07:01  -  06 Feb 2018 07:00  --------------------------------------------------------  IN: 100 mL / OUT: 3 mL / NET: 97 mL      PHYSICAL EXAM:  Constitutional: comfortable, NAD  HEENT: oral lesions have resolved  Respiratory: lungs CTAB, no w/r/r  Cardiovascular: RRR, no m/r/g  Gastrointestinal: NT/ND, hepatic port palpable      IMAGING:    CT ABD/PEL (1/28):  Circumferential distal wall bowel wall thickening and surrounding reactive change most pronounced in the right lower quadrant consistent with enteritis.  Remainder of the incidental findings as above.

## 2018-02-06 NOTE — PROGRESS NOTE ADULT - ASSESSMENT
Doing better. Reports no emesis overnight. Still having diarrhea. Afebrile past 24 hours. Flu B +ve. ID on board, Rx no Tamiflu at this time as symptom onset greater > 48 hours. Doing better. Reports no emesis overnight. Still having diarrhea. Afebrile past 24 hours. Also reports appetite has returned - tolerated 2 PO meals. Flu B +ve. ID on board - will start Tamiflu 75 BID.

## 2018-02-06 NOTE — PROGRESS NOTE ADULT - SUBJECTIVE AND OBJECTIVE BOX
NOLASHANI  63y  Female    INTERVAL EVENTS: 2 hours after Diflucan, pt felt a lot better.  Pt ate 2 (almost) full, solid meals today for breakfast and lunch.  Pt still having numerous BMs (like 8-9), but the volume of liquid is much less (better).  Abd discomfort is better.  There are no more flu symptoms.  Pt starting to now feel like she is on the mend.    T(C): 37.4 (02-06-18 @ 05:36), Max: 37.5 (02-05-18 @ 20:51)  HR: 89 (02-06-18 @ 05:36) (88 - 89)  BP: 105/55 (02-06-18 @ 05:36) (105/55 - 109/60)  RR: 18 (02-06-18 @ 05:36) (18 - 18)  SpO2: --  Wt(kg): --Vital Signs Last 24 Hrs  T(C): 37.4 (06 Feb 2018 05:36), Max: 37.5 (05 Feb 2018 20:51)  T(F): 99.3 (06 Feb 2018 05:36), Max: 99.5 (05 Feb 2018 20:51)  HR: 89 (06 Feb 2018 05:36) (88 - 89)  BP: 105/55 (06 Feb 2018 05:36) (105/55 - 109/60)  BP(mean): --  RR: 18 (06 Feb 2018 05:36) (18 - 18)  SpO2: --    PHYSICAL EXAM:  GENERAL: no distress, sitting in chair  NECK: Supple, No JVD, Normal thyroid  CHEST/LUNG: Clear; No crackles or wheezing  HEART: S1, S2, Regular rate and rhythm;   ABDOMEN: Soft, Nontender, Nondistended; Bowel sounds present  EXTREMITIES: No clubbing, cyanosis, or edema  SKIN: No rashes or lesions    LABS: c diff rpt is neg  WBC now nl and not low  Lytes better    RVP: + FLu B    Labs:                        9.6    6.27  )-----------( 291      ( 05 Feb 2018 11:29 )             27.9             02-05    129<L>  |  100  |  10  ----------------------------<  123<H>  3.9   |  24  |  0.6<L>    Ca    7.4<L>      05 Feb 2018 11:29  Phos  3.0     02-05  Mg     1.8     02-05    RADIOLOGY & ADDITIONAL TESTS: < from: CT Abdomen and Pelvis No Cont (02.05.18 @ 18:00) >  When compared to prior study dated 1/28/2018:    Stable postsurgical changes to the liver. No evidence for extrahepatic   fluid collection or abscess.    Worsening right lower quadrant enteritis/ileitis.    * While CT seems "worse," the pt is much better clinically - no need to act differently for CT findings      acetaminophen   Tablet 650 milliGRAM(s) Oral every 4 hours PRN  acetaminophen  Suppository 650 milliGRAM(s) Rectal every 6 hours PRN  enoxaparin Injectable 40 milliGRAM(s) SubCutaneous daily  fluconAZOLE IVPB 200 milliGRAM(s) IV Intermittent every 24 hours  fluconAZOLE IVPB      loperamide 2 milliGRAM(s) Oral every 6 hours PRN  metoclopramide Injectable 10 milliGRAM(s) IV Push every 6 hours PRN  pantoprazole  Injectable 40 milliGRAM(s) IV Push daily  Parenteral Nutrition - Adult 1 Each TPN Continuous <Continuous>  Parenteral Nutrition - Adult 1 Each TPN Continuous <Continuous>

## 2018-02-06 NOTE — PROGRESS NOTE ADULT - PROBLEM SELECTOR PLAN 3
- newly visible white lesions on mouth extending into pharynx  - c/w Fluconazole  - ID on board - Rx c/w Fluconazole, D/C Cipro+Flagyl, no Tamiflu indicated - oral lesions have disappeared. Reports some pain but much less.  - c/w Fluconazole  - ID on board - Rx c/w Fluconazole, D/C Cipro+Flagyl, no Tamiflu indicated

## 2018-02-06 NOTE — CONSULT NOTE ADULT - ASSESSMENT
PRELIMINARY Impression: Enteritis improving. Afebrile in last 24 hours. Fevers likely 2/2 white blood cell reconstitution. D/c cipro/flagyl. PRELIMINARY Impression: Enteritis improving. Afebrile in last 24 hours. Fevers likely 2/2 white blood cell reconstitution. D/c cipro/flagyl. C/w diflucan. No indication for tamiflu at this time as flu symptoms started one month ago.

## 2018-02-06 NOTE — PROGRESS NOTE ADULT - ASSESSMENT
*. Fever - probably from thrush and esoph cand (systemic hiwot inf)  Cont diflucan 200 q24h IV, will change to PO prob cesar (after d/w ID) and perhaps lower dose to 100mg  check bld cx  ID eval - Dr. De La Paz  ignore worsening on CT, pt is better    * Flu B +  Pt better without tamiflu, see no reason to start it and symptoms started > 48 hrs ago  Can D/C droplet, pt is afeb and better    * chemo-induced enteritis  cont TPN - lytes better, fix Na via TPN  continue imodium  keep off cipro/flagyl - they were obviously not helping    *. st4 colon ca - [place] [facility] [place]    * spoke with Onc at [place] Dr. Ole Daily [phone] yesterday    not ready for d/c; perhaps in 48-72 hrs

## 2018-02-07 LAB
ANION GAP SERPL CALC-SCNC: 6 MMOL/L — LOW (ref 7–14)
ANISOCYTOSIS BLD QL: SIGNIFICANT CHANGE UP
BUN SERPL-MCNC: 8 MG/DL — LOW (ref 10–20)
CALCIUM SERPL-MCNC: 8 MG/DL — LOW (ref 8.5–10.1)
CHLORIDE SERPL-SCNC: 101 MMOL/L — SIGNIFICANT CHANGE UP (ref 98–110)
CO2 SERPL-SCNC: 24 MMOL/L — SIGNIFICANT CHANGE UP (ref 17–32)
CREAT SERPL-MCNC: 0.5 MG/DL — LOW (ref 0.7–1.5)
GIANT PLATELETS BLD QL SMEAR: PRESENT — SIGNIFICANT CHANGE UP
GLUCOSE SERPL-MCNC: 92 MG/DL — SIGNIFICANT CHANGE UP (ref 70–110)
HCT VFR BLD CALC: 31.1 % — LOW (ref 37–47)
HGB BLD-MCNC: 10.5 G/DL — LOW (ref 14–18)
LYMPHOCYTES # SPEC AUTO: 2.6 % — HIGH (ref 0–0)
MACROCYTES BLD QL: SLIGHT — SIGNIFICANT CHANGE UP
MAGNESIUM SERPL-MCNC: 2.1 MG/DL — SIGNIFICANT CHANGE UP (ref 1.8–2.4)
MANUAL SMEAR VERIFICATION: SIGNIFICANT CHANGE UP
MCHC RBC-ENTMCNC: 33.3 PG — HIGH (ref 27–31)
MCHC RBC-ENTMCNC: 33.8 G/DL — SIGNIFICANT CHANGE UP (ref 32–37)
MCV RBC AUTO: 98.7 FL — HIGH (ref 81–91)
METAMYELOCYTES # FLD: 0.9 % — HIGH (ref 0–0)
MYELOCYTES NFR BLD: 1.7 % — HIGH (ref 0–0)
NEUTS BAND # BLD: 6.1 % — HIGH (ref 0–6)
NRBC # BLD: 0 /100 WBCS — SIGNIFICANT CHANGE UP (ref 0–0)
PHOSPHATE SERPL-MCNC: 3.6 MG/DL — SIGNIFICANT CHANGE UP (ref 2.1–4.9)
PLAT MORPH BLD: NORMAL — SIGNIFICANT CHANGE UP
PLATELET # BLD AUTO: 436 K/UL — HIGH (ref 130–400)
POTASSIUM SERPL-MCNC: 4.6 MMOL/L — SIGNIFICANT CHANGE UP (ref 3.5–5)
POTASSIUM SERPL-SCNC: 4.6 MMOL/L — SIGNIFICANT CHANGE UP (ref 3.5–5)
PROT SERPL-MCNC: 4.8 G/DL — LOW (ref 6–8)
RBC # BLD: 3.15 M/UL — LOW (ref 4.2–5.4)
RBC # FLD: 14.4 % — SIGNIFICANT CHANGE UP (ref 11.5–14.5)
RBC BLD AUTO: NORMAL — SIGNIFICANT CHANGE UP
SMUDGE CELLS # BLD: PRESENT — SIGNIFICANT CHANGE UP
SODIUM SERPL-SCNC: 131 MMOL/L — LOW (ref 135–146)
VARIANT LYMPHS # BLD: 10.4 % — HIGH (ref 0–5)
WBC # BLD: 5.53 K/UL — SIGNIFICANT CHANGE UP (ref 4.8–10.8)
WBC # FLD AUTO: 5.53 K/UL — SIGNIFICANT CHANGE UP (ref 4.8–10.8)

## 2018-02-07 RX ORDER — FLUCONAZOLE 150 MG/1
200 TABLET ORAL EVERY 24 HOURS
Qty: 0 | Refills: 0 | Status: DISCONTINUED | OUTPATIENT
Start: 2018-02-07 | End: 2018-02-07

## 2018-02-07 RX ORDER — LOPERAMIDE HCL 2 MG
2 TABLET ORAL EVERY 4 HOURS
Qty: 0 | Refills: 0 | Status: DISCONTINUED | OUTPATIENT
Start: 2018-02-07 | End: 2018-02-09

## 2018-02-07 RX ORDER — ELECTROLYTE SOLUTION,INJ
1 VIAL (ML) INTRAVENOUS
Qty: 0 | Refills: 0 | Status: DISCONTINUED | OUTPATIENT
Start: 2018-02-07 | End: 2018-02-07

## 2018-02-07 RX ORDER — LOPERAMIDE HCL 2 MG
2 TABLET ORAL EVERY 4 HOURS
Qty: 0 | Refills: 0 | Status: DISCONTINUED | OUTPATIENT
Start: 2018-02-07 | End: 2018-02-07

## 2018-02-07 RX ORDER — LOPERAMIDE HCL 2 MG
4 TABLET ORAL ONCE
Qty: 0 | Refills: 0 | Status: COMPLETED | OUTPATIENT
Start: 2018-02-07 | End: 2018-02-07

## 2018-02-07 RX ORDER — FLUCONAZOLE 150 MG/1
100 TABLET ORAL EVERY 24 HOURS
Qty: 0 | Refills: 0 | Status: DISCONTINUED | OUTPATIENT
Start: 2018-02-07 | End: 2018-02-08

## 2018-02-07 RX ORDER — LOPERAMIDE HCL 2 MG
2 TABLET ORAL ONCE
Qty: 0 | Refills: 0 | Status: DISCONTINUED | OUTPATIENT
Start: 2018-02-07 | End: 2018-02-07

## 2018-02-07 RX ORDER — PANTOPRAZOLE SODIUM 20 MG/1
40 TABLET, DELAYED RELEASE ORAL
Qty: 0 | Refills: 0 | Status: DISCONTINUED | OUTPATIENT
Start: 2018-02-07 | End: 2018-02-08

## 2018-02-07 RX ORDER — SIMETHICONE 80 MG/1
80 TABLET, CHEWABLE ORAL EVERY 6 HOURS
Qty: 0 | Refills: 0 | Status: DISCONTINUED | OUTPATIENT
Start: 2018-02-07 | End: 2018-02-09

## 2018-02-07 RX ADMIN — SIMETHICONE 80 MILLIGRAM(S): 80 TABLET, CHEWABLE ORAL at 18:36

## 2018-02-07 RX ADMIN — PANTOPRAZOLE SODIUM 40 MILLIGRAM(S): 20 TABLET, DELAYED RELEASE ORAL at 11:50

## 2018-02-07 RX ADMIN — Medication 2 MILLIGRAM(S): at 14:39

## 2018-02-07 RX ADMIN — Medication 10 MILLIGRAM(S): at 19:37

## 2018-02-07 RX ADMIN — Medication 1 EACH: at 21:16

## 2018-02-07 RX ADMIN — Medication 4 MILLIGRAM(S): at 10:23

## 2018-02-07 RX ADMIN — Medication 1 EACH: at 01:17

## 2018-02-07 RX ADMIN — Medication 75 MILLIGRAM(S): at 05:27

## 2018-02-07 RX ADMIN — Medication 75 MILLIGRAM(S): at 18:35

## 2018-02-07 RX ADMIN — Medication 1 EACH: at 21:18

## 2018-02-07 RX ADMIN — ENOXAPARIN SODIUM 40 MILLIGRAM(S): 100 INJECTION SUBCUTANEOUS at 11:50

## 2018-02-07 RX ADMIN — FLUCONAZOLE 100 MILLIGRAM(S): 150 TABLET ORAL at 11:49

## 2018-02-07 NOTE — PROGRESS NOTE ADULT - PROBLEM SELECTOR PLAN 4
-continue TPN - orders entered.   -encouraged intake of certain foods which may help with the diarrhea  -Immodium to start today, per primary team  -attention to fluid status  -f/u zinc level

## 2018-02-07 NOTE — PROGRESS NOTE ADULT - PROBLEM SELECTOR PLAN 3
- oral lesions have resolved.  - c/w Fluconazole 100 QD for 10 more days (end Feb 17)  - ID on board - Rx c/w Fluconazole, Tamiflu

## 2018-02-07 NOTE — PROGRESS NOTE ADULT - SUBJECTIVE AND OBJECTIVE BOX
62 yo woman adm 1/29 with nausea, vomiting, diarrhea, with poor oral intake for several days PTA.    Vital Signs Last 24 Hrs  T(C): 36.8 (07 Feb 2018 06:07), Max: 37.9 (06 Feb 2018 15:02)  T(F): 98.3 (07 Feb 2018 06:07), Max: 100.2 (06 Feb 2018 15:02)  HR: 93 (07 Feb 2018 06:07) (91 - 97)  BP: 103/56 (07 Feb 2018 06:07) (100/58 - 107/59)  BP(mean): --  RR: 18 (06 Feb 2018 22:20) (18 - 18)  SpO2: 98% (07 Feb 2018 08:50) (98% - 98%)                        10.5   5.53  )-----------( 436      ( 07 Feb 2018 07:46 )             31.1   02-07    131<L>  |  101  |  8<L>  ----------------------------<  92  4.6   |  24  |  0.5<L>    Ca    8.0<L>      07 Feb 2018 07:46  Phos  3.6     02-07  Mg     2.1     02-07    TPro  4.8<L>  /  Alb  x   /  TBili  x   /  DBili  x   /  AST  x   /  ALT  x   /  AlkPhos  x   02-07  PHYSICAL EXAM:      Constitutional: A&O, spent a long time speaking with pt about her problems and progress. Able to eat yesterday and today, although still grossly inadequate.    ENMT: mouth clear, no visible thrush today (pt states it was terrible 2 days ago), lips cracked, tongue darker red  Gastrointestinal: abd soft, no pain. +discomfort from cramping and diarrhea, claims over 15 watery grren/black BMs daily. nausea markedly improved since diflucan started.  Extremities: no edema  Vascular: right chest port accessed, dressing intact, site clean  Skin: turgor good  Musculoskeletal: c/o weakness due to frequency and volume of watery diarrhea

## 2018-02-07 NOTE — PROGRESS NOTE ADULT - ASSESSMENT
*. Fever - probably from thrush and esoph cand (systemic hiwot inf)  Cont diflucan 200 q24h IV, will change to PO prob cesar (after d/w ID) and perhaps lower dose to 100mg  check bld cx  ID eval - Dr. De La Paz  ignore worsening on CT, pt is better    * Flu B +  ID wants to use tamiflu to prevent potential outbreak, OK, will give 5-day course  Cont Droplet Iso until ID says it can be removed    * chemo-induced enteritis  cont TPN - lytes better, fix Na via TPN  continue imodium, but q4h ATC  add simethicone for gas, 80mg po q6h ATC    *. st4 colon ca - will f/u at [facility] [place]    * will speak with Onc at [place] Dr. Ole Daily [phone] re: hep art inf pump - pt thinks pump might need to be "primed" again with saline and heparin.  I will ask her onc.    not ready for d/c; perhaps in 48-72 hrs

## 2018-02-07 NOTE — PROGRESS NOTE ADULT - SUBJECTIVE AND OBJECTIVE BOX
62 yo F w/ PMH of Colon cancer [place] [facility] [place] and recently [place] intrahepatic arterial port for 5FU Tx p/w N/V/D after her last chemtherapy session on Wednesday. Patient has had similar symptoms after chemo in past but the recent arterial port concerned her. Denies any F/C/SOB/AP or other symptoms.    PMH:  Colon Ca stage 4 w/ liver mets    PSH:  Hemicolectomy    Allergies:  morphine (Hives)  sulfonamides (Other)    MEDICATIONS  (STANDING):  enoxaparin Injectable 40 milliGRAM(s) SubCutaneous daily  fluconAZOLE   Tablet 100 milliGRAM(s) Oral every 24 hours  loperamide 2 milliGRAM(s) Oral every 4 hours  oseltamivir 75 milliGRAM(s) Oral every 12 hours  pantoprazole    Tablet 40 milliGRAM(s) Oral before breakfast  Parenteral Nutrition - Adult 1 Each (75 mL/Hr) TPN Continuous <Continuous>  Parenteral Nutrition - Adult 1 Each (75 mL/Hr) TPN Continuous <Continuous>  simethicone 80 milliGRAM(s) Chew every 6 hours    MEDICATIONS  (PRN):  acetaminophen   Tablet 650 milliGRAM(s) Oral every 4 hours PRN For Temp greater than 38 C (100.4 F)  acetaminophen  Suppository 650 milliGRAM(s) Rectal every 6 hours PRN For Temp greater than 38.5 C (101.3 F)  metoclopramide Injectable 10 milliGRAM(s) IV Push every 6 hours PRN nausea      Labs:                        10.5   5.53  )-----------( 436      ( 07 Feb 2018 07:46 )             31.1     CBC Full  -  ( 07 Feb 2018 07:46 )  WBC Count : 5.53 K/uL  Hemoglobin : 10.5 g/dL  Hematocrit : 31.1 %  Platelet Count - Automated : 436 K/uL  Mean Cell Volume : 98.7 fL  Mean Cell Hemoglobin : 33.3 pg  Mean Cell Hemoglobin Concentration : 33.8 g/dL  Auto Neutrophil # : x  Auto Lymphocyte # : x  Auto Monocyte # : x  Auto Eosinophil # : x  Auto Basophil # : x  Auto Neutrophil % : x  Auto Lymphocyte % : x  Auto Monocyte % : x  Auto Eosinophil % : x  Auto Basophil % : x    02-07    131<L>  |  101  |  8<L>  ----------------------------<  92  4.6   |  24  |  0.5<L>    Ca    8.0<L>      07 Feb 2018 07:46  Phos  3.6     02-07  Mg     2.1     02-07    TPro  4.8<L>  /  Alb  x   /  TBili  x   /  DBili  x   /  AST  x   /  ALT  x   /  AlkPhos  x   02-07    Vital Signs Last 24 Hrs  T(C): 37.7 (07 Feb 2018 13:08), Max: 37.8 (06 Feb 2018 22:20)  T(F): 99.9 (07 Feb 2018 13:08), Max: 100.1 (06 Feb 2018 22:20)  HR: 98 (07 Feb 2018 13:08) (93 - 98)  BP: 98/58 (07 Feb 2018 13:08) (98/58 - 107/59)  BP(mean): --  RR: 18 (07 Feb 2018 13:08) (18 - 18)  SpO2: 98% (07 Feb 2018 08:50) (98% - 98%)    PHYSICAL EXAM:  Constitutional: comfortable, NAD  HEENT: oral lesions have resolved  Respiratory: lungs CTAB, no w/r/r  Cardiovascular: RRR, no m/r/g  Gastrointestinal: NT/ND, hepatic port palpable      IMAGING:    CT ABD/PEL (1/28):  Circumferential distal wall bowel wall thickening and surrounding reactive change most pronounced in the right lower quadrant consistent with enteritis.  Remainder of the incidental findings as above.

## 2018-02-07 NOTE — PROGRESS NOTE ADULT - SUBJECTIVE AND OBJECTIVE BOX
NOLA SHANI  63y  Female    INTERVAL EVENTS: Pt still c/o diarrhea, up to 10 episodes a day.  Feels gassy as well.  Pt can still eat.  Little to no pain.  No N/V.    T(C): 37.4 (02-06-18 @ 05:36), Max: 37.5 (02-05-18 @ 20:51)  HR: 89 (02-06-18 @ 05:36) (88 - 89)  BP: 105/55 (02-06-18 @ 05:36) (105/55 - 109/60)  RR: 18 (02-06-18 @ 05:36) (18 - 18)  SpO2: --  Wt(kg): --Vital Signs Last 24 Hrs  T(C): 37.4 (06 Feb 2018 05:36), Max: 37.5 (05 Feb 2018 20:51)  T(F): 99.3 (06 Feb 2018 05:36), Max: 99.5 (05 Feb 2018 20:51)  HR: 89 (06 Feb 2018 05:36) (88 - 89)  BP: 105/55 (06 Feb 2018 05:36) (105/55 - 109/60)  BP(mean): --  RR: 18 (06 Feb 2018 05:36) (18 - 18)  SpO2: --    PHYSICAL EXAM:  GENERAL: no distress  NECK: Supple, No JVD, Normal thyroid  CHEST/LUNG: Clear; No crackles or wheezing  HEART: S1, S2, Regular rate and rhythm;   ABDOMEN: Soft, Nontender, Nondistended; Bowel sounds present  EXTREMITIES: No clubbing, cyanosis, or edema  SKIN: No rashes or lesions    LABS: c diff rpt is neg  RVP: + FLu B    Labs:                        10.5   5.53  )-----------( 436      ( 07 Feb 2018 07:46 )             31.1             02-07    131<L>  |  101  |  8<L>  ----------------------------<  92 Sodium better  4.6   |  24  |  0.5<L>    Ca    8.0<L>      07 Feb 2018 07:46  Phos  3.6     02-07  Mg     2.1     02-07    TPro  4.8<L>  /  Alb  x   /  TBili  x   /  DBili  x   /  AST  x   /  ALT  x   /  AlkPhos  x   02-07    LIVER FUNCTIONS - ( 07 Feb 2018 07:46 )  Alb: x     / Pro: 4.8 g/dL / ALK PHOS: x     / ALT: x     / AST: x     / GGT: x                   RADIOLOGY & ADDITIONAL TESTS: < from: CT Abdomen and Pelvis No Cont (02.05.18 @ 18:00) >  When compared to prior study dated 1/28/2018:    Stable postsurgical changes to the liver. No evidence for extrahepatic   fluid collection or abscess.    Worsening right lower quadrant enteritis/ileitis.    * While CT seems "worse," the pt is much better clinically - no need to act differently for CT findings      acetaminophen   Tablet 650 milliGRAM(s) Oral every 4 hours PRN  acetaminophen  Suppository 650 milliGRAM(s) Rectal every 6 hours PRN  enoxaparin Injectable 40 milliGRAM(s) SubCutaneous daily  fluconAZOLE IVPB 200 milliGRAM(s) IV Intermittent every 24 hours  fluconAZOLE IVPB      loperamide 2 milliGRAM(s) Oral every 6 hours PRN  metoclopramide Injectable 10 milliGRAM(s) IV Push every 6 hours PRN  pantoprazole  Injectable 40 milliGRAM(s) IV Push daily  Parenteral Nutrition - Adult 1 Each TPN Continuous <Continuous>  Parenteral Nutrition - Adult 1 Each TPN Continuous <Continuous>

## 2018-02-07 NOTE — PROGRESS NOTE ADULT - SUBJECTIVE AND OBJECTIVE BOX
SHANI VACA  63y, Female    63 year old female pmhx of stage IV colon cancer s/p hemicolectomy on Chemo with [email or web address] [email or web address] ( since Oct, 2017 [id number]) and recently implantated Intrahepatic arterial port for hepatic mets for 5-FU treatment presented with  n/v/d and after her last chemotherapy on Wednesday.   OVERNIGHT EVENTS:    PMH:    Colon CA stage IV with liver mets    PSH:    Hemicolectomy    VITALS:  T(F): 98.3, Max: 100.2 (02-06-18 @ 15:02)  HR: 93  BP: 103/56  RR: 18Vital Signs Last 24 Hrs  T(C): 36.8 (07 Feb 2018 06:07), Max: 37.9 (06 Feb 2018 15:02)  T(F): 98.3 (07 Feb 2018 06:07), Max: 100.2 (06 Feb 2018 15:02)  HR: 93 (07 Feb 2018 06:07) (91 - 97)  BP: 103/56 (07 Feb 2018 06:07) (100/58 - 107/59)  BP(mean): --  RR: 18 (06 Feb 2018 22:20) (18 - 18)  SpO2: 98% (07 Feb 2018 08:50) (98% - 98%)    TESTS & MEASUREMENTS:                        9.6    6.27  )-----------( 291      ( 05 Feb 2018 11:29 )             27.9     02-05    129<L>  |  100  |  10  ----------------------------<  123<H>  3.9   |  24  |  0.6<L>    Ca    7.4<L>      05 Feb 2018 11:29  Phos  3.0     02-05  Mg     1.8     02-05          Culture - Blood (collected 02-04-18 @ 11:32)  Source: .Blood None  Preliminary Report (02-06-18 @ 01:02):    No growth to date.            RADIOLOGY & ADDITIONAL TESTS:    ANTIBIOTICS:  fluconAZOLE IVPB 200 milliGRAM(s) IV Intermittent every 24 hours  fluconAZOLE IVPB      oseltamivir 75 milliGRAM(s) Oral every 12 hours

## 2018-02-07 NOTE — PROGRESS NOTE ADULT - ASSESSMENT
Pt doing well.   No dysphagia.  Diflucan 100mg q24h for 10 more days.    Tamiflu for % days in all.  Could discontinue isolation 2/8.    Recall prn please

## 2018-02-08 LAB
ANION GAP SERPL CALC-SCNC: 6 MMOL/L — LOW (ref 7–14)
BUN SERPL-MCNC: 7 MG/DL — LOW (ref 10–20)
CALCIUM SERPL-MCNC: 7.7 MG/DL — LOW (ref 8.5–10.1)
CHLORIDE SERPL-SCNC: 99 MMOL/L — SIGNIFICANT CHANGE UP (ref 98–110)
CO2 SERPL-SCNC: 23 MMOL/L — SIGNIFICANT CHANGE UP (ref 17–32)
CREAT SERPL-MCNC: 0.3 MG/DL — LOW (ref 0.7–1.5)
GLUCOSE SERPL-MCNC: 95 MG/DL — SIGNIFICANT CHANGE UP (ref 70–110)
MAGNESIUM SERPL-MCNC: 2.5 MG/DL — HIGH (ref 1.8–2.4)
PHOSPHATE SERPL-MCNC: 3.9 MG/DL — SIGNIFICANT CHANGE UP (ref 2.1–4.9)
POTASSIUM SERPL-MCNC: 4.9 MMOL/L — SIGNIFICANT CHANGE UP (ref 3.5–5)
POTASSIUM SERPL-SCNC: 4.9 MMOL/L — SIGNIFICANT CHANGE UP (ref 3.5–5)
SODIUM SERPL-SCNC: 128 MMOL/L — LOW (ref 135–146)

## 2018-02-08 RX ORDER — FLUCONAZOLE 150 MG/1
TABLET ORAL
Qty: 0 | Refills: 0 | Status: DISCONTINUED | OUTPATIENT
Start: 2018-02-08 | End: 2018-02-09

## 2018-02-08 RX ORDER — FLUCONAZOLE 150 MG/1
100 TABLET ORAL ONCE
Qty: 0 | Refills: 0 | Status: COMPLETED | OUTPATIENT
Start: 2018-02-08 | End: 2018-02-08

## 2018-02-08 RX ORDER — PANTOPRAZOLE SODIUM 20 MG/1
40 TABLET, DELAYED RELEASE ORAL
Qty: 0 | Refills: 0 | Status: DISCONTINUED | OUTPATIENT
Start: 2018-02-08 | End: 2018-02-08

## 2018-02-08 RX ORDER — FLUCONAZOLE 150 MG/1
100 TABLET ORAL EVERY 24 HOURS
Qty: 0 | Refills: 0 | Status: DISCONTINUED | OUTPATIENT
Start: 2018-02-09 | End: 2018-02-09

## 2018-02-08 RX ORDER — ELECTROLYTE SOLUTION,INJ
1 VIAL (ML) INTRAVENOUS
Qty: 0 | Refills: 0 | Status: DISCONTINUED | OUTPATIENT
Start: 2018-02-08 | End: 2018-02-08

## 2018-02-08 RX ORDER — PANTOPRAZOLE SODIUM 20 MG/1
40 TABLET, DELAYED RELEASE ORAL DAILY
Qty: 0 | Refills: 0 | Status: DISCONTINUED | OUTPATIENT
Start: 2018-02-08 | End: 2018-02-09

## 2018-02-08 RX ADMIN — FLUCONAZOLE 50 MILLIGRAM(S): 150 TABLET ORAL at 16:12

## 2018-02-08 RX ADMIN — Medication 75 MILLIGRAM(S): at 05:14

## 2018-02-08 RX ADMIN — PANTOPRAZOLE SODIUM 40 MILLIGRAM(S): 20 TABLET, DELAYED RELEASE ORAL at 06:27

## 2018-02-08 RX ADMIN — Medication 1 EACH: at 20:46

## 2018-02-08 RX ADMIN — ENOXAPARIN SODIUM 40 MILLIGRAM(S): 100 INJECTION SUBCUTANEOUS at 13:10

## 2018-02-08 RX ADMIN — Medication 10 MILLIGRAM(S): at 07:49

## 2018-02-08 RX ADMIN — PANTOPRAZOLE SODIUM 40 MILLIGRAM(S): 20 TABLET, DELAYED RELEASE ORAL at 18:09

## 2018-02-08 NOTE — PROGRESS NOTE ADULT - SUBJECTIVE AND OBJECTIVE BOX
62 yo F w/ PMH of Colon cancer on chemo at [facility] [place] and recently implanted intrahepatic arterial port for 5FU Tx p/w N/V/D after her last chemtherapy session on Wednesday. Patient has had similar symptoms after chemo in past but the recent arterial port concerned her. Denies any F/C/SOB/AP or other symptoms.    PMH:  Colon Ca stage 4 w/ liver mets    PSH:  Hemicolectomy    Allergies:  morphine (Hives)  sulfonamides (Other)    MEDICATIONS  (STANDING):  enoxaparin Injectable 40 milliGRAM(s) SubCutaneous daily  fluconAZOLE   Tablet 100 milliGRAM(s) Oral every 24 hours  fluconAZOLE IVPB      loperamide 2 milliGRAM(s) Oral every 4 hours  oseltamivir 75 milliGRAM(s) Oral every 12 hours  pantoprazole    Tablet 40 milliGRAM(s) Oral before breakfast  pantoprazole  Injectable 40 milliGRAM(s) IV Push daily  Parenteral Nutrition - Adult 1 Each (75 mL/Hr) TPN Continuous <Continuous>  simethicone 80 milliGRAM(s) Chew every 6 hours    MEDICATIONS  (PRN):  acetaminophen   Tablet 650 milliGRAM(s) Oral every 4 hours PRN For Temp greater than 38 C (100.4 F)  acetaminophen  Suppository 650 milliGRAM(s) Rectal every 6 hours PRN For Temp greater than 38.5 C (101.3 F)  metoclopramide Injectable 10 milliGRAM(s) IV Push every 6 hours PRN nausea      Labs:                        10.5   5.53  )-----------( 436      ( 07 Feb 2018 07:46 )             31.1     CBC Full  -  ( 07 Feb 2018 07:46 )  WBC Count : 5.53 K/uL  Hemoglobin : 10.5 g/dL  Hematocrit : 31.1 %  Platelet Count - Automated : 436 K/uL  Mean Cell Volume : 98.7 fL  Mean Cell Hemoglobin : 33.3 pg  Mean Cell Hemoglobin Concentration : 33.8 g/dL  Auto Neutrophil # : x  Auto Lymphocyte # : x  Auto Monocyte # : x  Auto Eosinophil # : x  Auto Basophil # : x  Auto Neutrophil % : x  Auto Lymphocyte % : x  Auto Monocyte % : x  Auto Eosinophil % : x  Auto Basophil % : x      02-08    128<L>  |  99  |  7<L>  ----------------------------<  95  4.9   |  23  |  0.3<L>    Ca    7.7<L>      08 Feb 2018 05:26  Phos  3.9     02-08  Mg     2.5     02-08    TPro  4.8<L>  /  Alb  x   /  TBili  x   /  DBili  x   /  AST  x   /  ALT  x   /  AlkPhos  x   02-07      Microbiology:      Vital Signs Last 24 Hrs  T(C): 36.4 (08 Feb 2018 06:00), Max: 37.7 (07 Feb 2018 13:08)  T(F): 97.5 (08 Feb 2018 06:00), Max: 99.9 (07 Feb 2018 13:08)  HR: 107 (08 Feb 2018 06:00) (95 - 107)  BP: 103/61 (08 Feb 2018 06:00) (98/58 - 111/61)  BP(mean): --  RR: 18 (08 Feb 2018 06:00) (18 - 18)  SpO2: 98% (07 Feb 2018 22:00) (98% - 98%)    I&O's Summary    07 Feb 2018 07:01  -  08 Feb 2018 07:00  --------------------------------------------------------  IN: 1320 mL / OUT: 0 mL / NET: 1320 mL        PHYSICAL EXAM:  Constitutional: comfortable, NAD  HEENT: oral lesions have resolved  Respiratory: lungs CTAB, no w/r/r  Cardiovascular: RRR, no m/r/g  Gastrointestinal: TTP in RLQ, ND, hepatic port palpable      IMAGING:    CT ABD/PEL (1/28):  Circumferential distal wall bowel wall thickening and surrounding reactive change most pronounced in the right lower quadrant consistent with enteritis.  Remainder of the incidental findings as above. 64 yo F w/ PMH of Colon cancer on chemo at [facility] [place] and recently implanted intrahepatic arterial port for 5FU Tx p/w N/V/D after her last chemtherapy session on Wednesday. Patient has had similar symptoms after chemo in past but the recent arterial port concerned her. Denies any F/C/SOB/AP or other symptoms.    PMH:  Colon Ca stage 4 w/ liver mets    PSH:  Hemicolectomy    Allergies:  morphine (Hives)  sulfonamides (Other)    MEDICATIONS  (STANDING):  enoxaparin Injectable 40 milliGRAM(s) SubCutaneous daily  fluconAZOLE IVPB      fluconAZOLE IVPB 100 milliGRAM(s) IV Intermittent once  loperamide 2 milliGRAM(s) Oral every 4 hours  oseltamivir 75 milliGRAM(s) Oral every 12 hours  pantoprazole    Tablet 40 milliGRAM(s) Oral before breakfast  pantoprazole  Injectable 40 milliGRAM(s) IV Push daily  Parenteral Nutrition - Adult 1 Each (75 mL/Hr) TPN Continuous <Continuous>  simethicone 80 milliGRAM(s) Chew every 6 hours    MEDICATIONS  (PRN):  acetaminophen   Tablet 650 milliGRAM(s) Oral every 4 hours PRN For Temp greater than 38 C (100.4 F)  acetaminophen  Suppository 650 milliGRAM(s) Rectal every 6 hours PRN For Temp greater than 38.5 C (101.3 F)  metoclopramide Injectable 10 milliGRAM(s) IV Push every 6 hours PRN nausea      Labs:                        10.5   5.53  )-----------( 436      ( 07 Feb 2018 07:46 )             31.1     CBC Full  -  ( 07 Feb 2018 07:46 )  WBC Count : 5.53 K/uL  Hemoglobin : 10.5 g/dL  Hematocrit : 31.1 %  Platelet Count - Automated : 436 K/uL  Mean Cell Volume : 98.7 fL  Mean Cell Hemoglobin : 33.3 pg  Mean Cell Hemoglobin Concentration : 33.8 g/dL  Auto Neutrophil # : x  Auto Lymphocyte # : x  Auto Monocyte # : x  Auto Eosinophil # : x  Auto Basophil # : x  Auto Neutrophil % : x  Auto Lymphocyte % : x  Auto Monocyte % : x  Auto Eosinophil % : x  Auto Basophil % : x      02-08    128<L>  |  99  |  7<L>  ----------------------------<  95  4.9   |  23  |  0.3<L>    Ca    7.7<L>      08 Feb 2018 05:26  Phos  3.9     02-08  Mg     2.5     02-08    TPro  4.8<L>  /  Alb  x   /  TBili  x   /  DBili  x   /  AST  x   /  ALT  x   /  AlkPhos  x   02-07      Microbiology:      Vital Signs Last 24 Hrs  T(C): 36.4 (08 Feb 2018 06:00), Max: 37.7 (07 Feb 2018 13:08)  T(F): 97.5 (08 Feb 2018 06:00), Max: 99.9 (07 Feb 2018 13:08)  HR: 107 (08 Feb 2018 06:00) (95 - 107)  BP: 103/61 (08 Feb 2018 06:00) (98/58 - 111/61)  BP(mean): --  RR: 18 (08 Feb 2018 06:00) (18 - 18)  SpO2: 98% (07 Feb 2018 22:00) (98% - 98%)    I&O's Summary    07 Feb 2018 07:01  -  08 Feb 2018 07:00  --------------------------------------------------------  IN: 1320 mL / OUT: 0 mL / NET: 1320 mL        PHYSICAL EXAM:  Constitutional: comfortable, NAD  HEENT: oral lesions have resolved  Respiratory: lungs CTAB, no w/r/r  Cardiovascular: RRR, no m/r/g  Gastrointestinal: TTP in RLQ, ND, hepatic port palpable      IMAGING:    CT ABD/PEL (1/28):  Circumferential distal wall bowel wall thickening and surrounding reactive change most pronounced in the right lower quadrant consistent with enteritis.  Remainder of the incidental findings as above. 62 yo F w/ PMH of Colon cancer on chemo at [facility] [place] and recently implanted intrahepatic arterial port for 5FU Tx p/w N/V/D after her last chemtherapy session on Wednesday. Patient has had similar symptoms after chemo in past but the recent arterial port concerned her. Denies any F/C/SOB/AP or other symptoms.    PMH:  Colon Ca stage 4 w/ liver mets    PSH:  Hemicolectomy    Allergies:  morphine (Hives)  sulfonamides (Other)    MEDICATIONS  (STANDING):  enoxaparin Injectable 40 milliGRAM(s) SubCutaneous daily  fluconAZOLE IVPB      loperamide 2 milliGRAM(s) Oral every 4 hours  pantoprazole    Tablet 40 milliGRAM(s) Oral before breakfast  pantoprazole  Injectable 40 milliGRAM(s) IV Push daily  Parenteral Nutrition - Adult 1 Each (75 mL/Hr) TPN Continuous <Continuous>  Parenteral Nutrition - Adult 1 Each (75 mL/Hr) TPN Continuous <Continuous>  simethicone 80 milliGRAM(s) Chew every 6 hours    MEDICATIONS  (PRN):  acetaminophen   Tablet 650 milliGRAM(s) Oral every 4 hours PRN For Temp greater than 38 C (100.4 F)  acetaminophen  Suppository 650 milliGRAM(s) Rectal every 6 hours PRN For Temp greater than 38.5 C (101.3 F)  metoclopramide Injectable 10 milliGRAM(s) IV Push every 6 hours PRN nausea    Labs:                        10.5   5.53  )-----------( 436      ( 07 Feb 2018 07:46 )             31.1     CBC Full  -  ( 07 Feb 2018 07:46 )  WBC Count : 5.53 K/uL  Hemoglobin : 10.5 g/dL  Hematocrit : 31.1 %  Platelet Count - Automated : 436 K/uL  Mean Cell Volume : 98.7 fL  Mean Cell Hemoglobin : 33.3 pg  Mean Cell Hemoglobin Concentration : 33.8 g/dL  Auto Neutrophil # : x  Auto Lymphocyte # : x  Auto Monocyte # : x  Auto Eosinophil # : x  Auto Basophil # : x  Auto Neutrophil % : x  Auto Lymphocyte % : x  Auto Monocyte % : x  Auto Eosinophil % : x  Auto Basophil % : x      02-08    128<L>  |  99  |  7<L>  ----------------------------<  95  4.9   |  23  |  0.3<L>    Ca    7.7<L>      08 Feb 2018 05:26  Phos  3.9     02-08  Mg     2.5     02-08    TPro  4.8<L>  /  Alb  x   /  TBili  x   /  DBili  x   /  AST  x   /  ALT  x   /  AlkPhos  x   02-07    Vital Signs Last 24 Hrs  T(C): 35.7 (08 Feb 2018 13:00), Max: 37.4 (07 Feb 2018 21:58)  T(F): 96.3 (08 Feb 2018 13:00), Max: 99.3 (07 Feb 2018 21:58)  HR: 92 (08 Feb 2018 13:00) (92 - 107)  BP: 104/63 (08 Feb 2018 13:00) (103/61 - 111/61)  BP(mean): --  RR: 18 (08 Feb 2018 13:00) (18 - 18)  SpO2: 98% (07 Feb 2018 22:00) (98% - 98%)    I&O's Summary    07 Feb 2018 07:01  -  08 Feb 2018 07:00  --------------------------------------------------------  IN: 1320 mL / OUT: 0 mL / NET: 1320 mL      PHYSICAL EXAM:  Constitutional: comfortable, NAD  HEENT: oral lesions have resolved  Respiratory: lungs CTAB, no w/r/r  Cardiovascular: RRR, no m/r/g  Gastrointestinal: TTP in RLQ, ND, hepatic port palpable      IMAGING:    CT ABD/PEL (1/28):  Circumferential distal wall bowel wall thickening and surrounding reactive change most pronounced in the right lower quadrant consistent with enteritis.  Remainder of the incidental findings as above.

## 2018-02-08 NOTE — PROGRESS NOTE ADULT - ASSESSMENT
Still complaining of diarrhea +abd cramping +gas, now yellowish from greenish-black. N/V returned last night around 6 pm with multiple episodes of vomiting. Continues to be nauseous this am, given reglan and switching meds to IV. Nausea may be due to tamiflu. Will consult ID about D/C. Will consult GI for possible flex sig due to lack of improvement in diarrhea. Still complaining of diarrhea +abd cramping +gas, now yellowish from greenish-black. N/V returned last night around 6 pm with multiple episodes of vomiting. Continues to be nauseous this am, given reglan and switching meds to IV. Patient does not want to take Tamiflu as she thinks it is cause of worsening N/V/D. Wants to be discharged tomorrow.

## 2018-02-08 NOTE — PROGRESS NOTE ADULT - SUBJECTIVE AND OBJECTIVE BOX
SHANI VACA  63y  Female    INTERVAL EVENTS: Pt feels that the Tamiflu is making her worse.  It is causing her to have vomiting and increased diarrhea.  Pt does NOT want to see GI.  Pt actually wants to go home tomorrow.      T(C): 37.4 (02-06-18 @ 05:36), Max: 37.5 (02-05-18 @ 20:51)  HR: 89 (02-06-18 @ 05:36) (88 - 89)  BP: 105/55 (02-06-18 @ 05:36) (105/55 - 109/60)  RR: 18 (02-06-18 @ 05:36) (18 - 18)  SpO2: --  Wt(kg): --Vital Signs Last 24 Hrs  T(C): 37.4 (06 Feb 2018 05:36), Max: 37.5 (05 Feb 2018 20:51)  T(F): 99.3 (06 Feb 2018 05:36), Max: 99.5 (05 Feb 2018 20:51)  HR: 89 (06 Feb 2018 05:36) (88 - 89)  BP: 105/55 (06 Feb 2018 05:36) (105/55 - 109/60)  BP(mean): --  RR: 18 (06 Feb 2018 05:36) (18 - 18)  SpO2: --    PHYSICAL EXAM:  GENERAL: no distress  NECK: Supple, No JVD, Normal thyroid  CHEST/LUNG: Clear; No crackles or wheezing  HEART: S1, S2, Regular rate and rhythm;   ABDOMEN: Soft, mildly tender - stable, Nondistended; Bowel sounds present  EXTREMITIES: No clubbing, cyanosis, or edema  SKIN: No rashes or lesions    Labs:                        10.5   5.53  )-----------( 436      ( 07 Feb 2018 07:46 )             31.1             02-08    128<L>  |  99  |  7<L>  ----------------------------<  95  4.9   |  23  |  0.3<L>    Ca    7.7<L>      08 Feb 2018 05:26  Phos  3.9     02-08  Mg     2.5     02-08    TPro  4.8<L>  /  Alb  x   /  TBili  x   /  DBili  x   /  AST  x   /  ALT  x   /  AlkPhos  x   02-07    LIVER FUNCTIONS - ( 07 Feb 2018 07:46 )  Alb: x     / Pro: 4.8 g/dL / ALK PHOS: x     / ALT: x     / AST: x     / GGT: x                   RADIOLOGY & ADDITIONAL TESTS: < from: CT Abdomen and Pelvis No Cont (02.05.18 @ 18:00) >  When compared to prior study dated 1/28/2018:    Stable postsurgical changes to the liver. No evidence for extrahepatic   fluid collection or abscess.    Worsening right lower quadrant enteritis/ileitis.    * While CT seems "worse," the pt is much better clinically - no need to act differently for CT findings      acetaminophen   Tablet 650 milliGRAM(s) Oral every 4 hours PRN  acetaminophen  Suppository 650 milliGRAM(s) Rectal every 6 hours PRN  enoxaparin Injectable 40 milliGRAM(s) SubCutaneous daily  fluconAZOLE IVPB 200 milliGRAM(s) IV Intermittent every 24 hours  fluconAZOLE IVPB      loperamide 2 milliGRAM(s) Oral every 6 hours PRN  metoclopramide Injectable 10 milliGRAM(s) IV Push every 6 hours PRN  pantoprazole  Injectable 40 milliGRAM(s) IV Push daily  Parenteral Nutrition - Adult 1 Each TPN Continuous <Continuous>  Parenteral Nutrition - Adult 1 Each TPN Continuous <Continuous>

## 2018-02-08 NOTE — PROGRESS NOTE ADULT - ASSESSMENT
*. Fever - resolved  Tx thrush for 10 days of oral diflucan 100mg from 2/7    * Flu B +  pt not tolerating tamiflu - d/c it  can d/c droplet  need to move pt out of rm 10 (other pt is on droplet) - RN mngmt aware    * chemo-induced enteritis - improving  cont TPN - lytes better, fix Na via TPN  continue imodium, but q4h ATC  simethicone for gas, 80mg po q6h ATC    *. st4 colon ca - will f/u at [facility] [place]    * spoke w/ Onc at [place] Dr. Ole Daily [phone] re: hep art inf pump - will need refill of NS/heparin by monday 2/12.    Pt would like to go home [facility].  Need to cycle off TPN (placed call to Dr. Carroll).  Anticipate d/c [facility] at 1pm.

## 2018-02-08 NOTE — PROGRESS NOTE ADULT - PROBLEM SELECTOR PLAN 2
- C diff -ve  - c/w Loperamide, simethicone  - Nutrition on board - c/w TPN  - GI consult for dago - C diff -ve  - c/w Loperamide, simethicone  - Nutrition on board - c/w TPN  - patient does not want to see GI

## 2018-02-08 NOTE — PROGRESS NOTE ADULT - PROBLEM SELECTOR PLAN 4
- Flu B +ve  - c/w Tamiflu 75 BID  - per ID can d/c droplet precaution - Flu B +ve  - patient does not want to take Tamiflu

## 2018-02-09 ENCOUNTER — TRANSCRIPTION ENCOUNTER (OUTPATIENT)
Age: 53
End: 2018-02-09

## 2018-02-09 VITALS
RESPIRATION RATE: 20 BRPM | SYSTOLIC BLOOD PRESSURE: 101 MMHG | TEMPERATURE: 97 F | HEART RATE: 87 BPM | DIASTOLIC BLOOD PRESSURE: 58 MMHG

## 2018-02-09 LAB
ANION GAP SERPL CALC-SCNC: 4 MMOL/L — LOW (ref 7–14)
BUN SERPL-MCNC: 10 MG/DL — SIGNIFICANT CHANGE UP (ref 10–20)
CALCIUM SERPL-MCNC: 8 MG/DL — LOW (ref 8.5–10.1)
CHLORIDE SERPL-SCNC: 103 MMOL/L — SIGNIFICANT CHANGE UP (ref 98–110)
CO2 SERPL-SCNC: 25 MMOL/L — SIGNIFICANT CHANGE UP (ref 17–32)
CREAT SERPL-MCNC: 0.5 MG/DL — LOW (ref 0.7–1.5)
GLUCOSE SERPL-MCNC: 93 MG/DL — SIGNIFICANT CHANGE UP (ref 70–110)
MAGNESIUM SERPL-MCNC: 2 MG/DL — SIGNIFICANT CHANGE UP (ref 1.8–2.4)
PHOSPHATE SERPL-MCNC: 3.9 MG/DL — SIGNIFICANT CHANGE UP (ref 2.1–4.9)
POTASSIUM SERPL-MCNC: 4.8 MMOL/L — SIGNIFICANT CHANGE UP (ref 3.5–5)
POTASSIUM SERPL-SCNC: 4.8 MMOL/L — SIGNIFICANT CHANGE UP (ref 3.5–5)
SODIUM SERPL-SCNC: 132 MMOL/L — LOW (ref 135–146)

## 2018-02-09 RX ORDER — ELECTROLYTES/DEXTROSE
1 SOLUTION, ORAL ORAL ONCE
Qty: 0 | Refills: 0 | Status: DISCONTINUED | OUTPATIENT
Start: 2018-02-09 | End: 2018-02-09

## 2018-02-09 RX ORDER — METOCLOPRAMIDE HCL 10 MG
5 TABLET ORAL
Qty: 40 | Refills: 0 | OUTPATIENT
Start: 2018-02-09

## 2018-02-09 RX ORDER — METOCLOPRAMIDE HCL 10 MG
1 TABLET ORAL
Qty: 40 | Refills: 0 | OUTPATIENT
Start: 2018-02-09

## 2018-02-09 RX ORDER — ACETAMINOPHEN 500 MG
2 TABLET ORAL
Qty: 0 | Refills: 0 | COMMUNITY
Start: 2018-02-09

## 2018-02-09 RX ORDER — SIMETHICONE 80 MG/1
1 TABLET, CHEWABLE ORAL
Qty: 0 | Refills: 0 | COMMUNITY
Start: 2018-02-09

## 2018-02-09 RX ORDER — ELECTROLYTES/DEXTROSE
1 SOLUTION, ORAL ORAL
Qty: 14 | Refills: 0 | OUTPATIENT
Start: 2018-02-09

## 2018-02-09 RX ORDER — PANTOPRAZOLE SODIUM 20 MG/1
40 TABLET, DELAYED RELEASE ORAL
Qty: 0 | Refills: 0 | Status: DISCONTINUED | OUTPATIENT
Start: 2018-02-09 | End: 2018-02-09

## 2018-02-09 RX ORDER — FLUCONAZOLE 150 MG/1
100 TABLET ORAL ONCE
Qty: 0 | Refills: 0 | Status: DISCONTINUED | OUTPATIENT
Start: 2018-02-09 | End: 2018-02-09

## 2018-02-09 RX ORDER — FLUCONAZOLE 150 MG/1
1 TABLET ORAL
Qty: 8 | Refills: 0 | OUTPATIENT
Start: 2018-02-09 | End: 2018-02-16

## 2018-02-09 RX ORDER — LOPERAMIDE HCL 2 MG
1 TABLET ORAL
Qty: 0 | Refills: 0 | COMMUNITY
Start: 2018-02-09

## 2018-02-09 RX ORDER — PANTOPRAZOLE SODIUM 20 MG/1
1 TABLET, DELAYED RELEASE ORAL
Qty: 0 | Refills: 0 | COMMUNITY
Start: 2018-02-09

## 2018-02-09 RX ADMIN — FLUCONAZOLE 50 MILLIGRAM(S): 150 TABLET ORAL at 12:00

## 2018-02-09 NOTE — DISCHARGE NOTE ADULT - MEDICATION SUMMARY - MEDICATIONS TO TAKE
I will START or STAY ON the medications listed below when I get home from the hospital:    acetaminophen 325 mg oral tablet  -- 2 tab(s) by mouth every 4 hours, As needed, For Temp greater than 38 C (100.4 F)  -- Indication: For Fever    loperamide 2 mg oral capsule  -- 1 cap(s) by mouth every 4 hours, As Needed  -- Indication: For Chemotherapy-induced enteritis    Reglan 5 mg oral tablet  -- 1 tab(s) by mouth every 6 hours  -for nausea MDD:Take 1 tab every 6 hours as needed.   -- May cause drowsiness.  Alcohol may intensify this effect.  Use care when operating dangerous machinery.  Take medication on an empty stomach 1 hour before or 2 to 3 hours after a meal unless otherwise directed by your doctor.    -- Indication: For Chemotherapy-induced enteritis    Diflucan 100 mg oral tablet  -- 1 tab(s) by mouth once a day   -- Do not take this drug if you are pregnant.  Finish all this medication unless otherwise directed by prescriber.    -- Indication: For Candida, oral    CeraLyte 50 oral powder for reconstitution  -- 1 packet(s) by mouth once a day   -- Expires___________________    -- Indication: For Chemotherapy-induced enteritis    simethicone 80 mg oral tablet, chewable  -- 1 tab(s) by mouth every 6 hours, As Needed  -- Indication: For Chemotherapy-induced enteritis    pantoprazole 40 mg oral delayed release tablet  -- 1 tab(s) by mouth once a day (before a meal)  -- Indication: For Chemotherapy-induced enteritis

## 2018-02-09 NOTE — DISCHARGE NOTE ADULT - PATIENT PORTAL LINK FT
You can access the [facility] Patient Portal, offered by [facility], by registering with the following website: [email or web address]

## 2018-02-09 NOTE — DISCHARGE NOTE ADULT - PLAN OF CARE
resolution Take all medications as prescribed. Stay well hydrated. Continue eating oral diet as tolerated. Finish course of anti-fungals as prescribed. symptomatic control Take Tylenol PRN for fever and pain. See PMD if symptoms worsen.

## 2018-02-09 NOTE — PROGRESS NOTE ADULT - PROBLEM SELECTOR PLAN 2
- C diff -ve  - c/w Loperamide, simethicone  - Nutrition on board - d/c tpn today  - patient does not want to see GI - C diff -ve  - c/w Loperamide, simethicone  - Nutrition on board - d/c tpn this am  - patient does not want to see GI

## 2018-02-09 NOTE — DISCHARGE NOTE ADULT - CARE PLAN
Principal Discharge DX:	Chemotherapy-induced enteritis  Goal:	resolution  Assessment and plan of treatment:	Take all medications as prescribed. Stay well hydrated. Continue eating oral diet as tolerated.  Secondary Diagnosis:	Candida, oral  Goal:	resolution  Assessment and plan of treatment:	Finish course of anti-fungals as prescribed.  Secondary Diagnosis:	Flu  Goal:	symptomatic control  Assessment and plan of treatment:	Take Tylenol PRN for fever and pain. See PMD if symptoms worsen.

## 2018-02-09 NOTE — PROGRESS NOTE ADULT - ASSESSMENT
*. Fever - resolved  Tx thrush for 10 days of oral diflucan 100mg from 2/7    * Flu B +  pt not tolerating tamiflu - d/c it  can d/c droplet    * chemo-induced enteritis - improving  d/c tpn  continue imodium, but q4h ATC  simethicone for gas, 80mg po q6h ATC  cont PPI daily, give Rx for reglan 5mg po q6h prn    *. st4 colon ca - will f[place] at [facility] [place]    * spoke w/ Onc at [place] Dr. Ole Daily [phone] re: hep art inf pump - will need refill of NS/heparin by monday 2/12 - pt is making her own appt    OK to go home today

## 2018-02-09 NOTE — DISCHARGE NOTE ADULT - HOSPITAL COURSE
Presented with nausea, vomiting, and diarrhea few days after receiving chemo session. Was started on TPN, Loperamide, and antibiotics. Oral candida was detected few days after admission. Treated successfully with Difluocan. Symptoms are improving. Feels ready to go home.

## 2018-02-09 NOTE — PROGRESS NOTE ADULT - PROBLEM SELECTOR PROBLEM 3
Chemotherapy-induced enteritis
Candida, oral

## 2018-02-09 NOTE — PROGRESS NOTE ADULT - PROBLEM SELECTOR PROBLEM 1
Colon cancer metastasized to lung
Colon cancer metastasized to lung
Flu
Colon cancer metastasized to lung

## 2018-02-09 NOTE — PROGRESS NOTE ADULT - ASSESSMENT
Pt's diarrhea is resolving, less volume overnight, but still very gassy. No more n/v since d/cing tafmiflu. She is still experiencing a cough and nasal mucous. Medications switched back to PO, pt will be discharged this afternoon. Pt's diarrhea is resolving, less volume overnight, but still very gassy. No more n/v since d/cing tamiflu. She is still experiencing a cough and nasal mucous. Medications switched back to PO, pt will be discharged this afternoon, plans have been made with her brother to take her home.

## 2018-02-09 NOTE — PROGRESS NOTE ADULT - PROBLEM SELECTOR PLAN 1
- receives chemo at [facility]  - Oncologist at [facility]: Dr Ole Mckeon (# [phone]), Dr Akilah English (# [phone])
- receives chemo at [facility] [place]  - Oncologist at [facility]: Dr Ole Mckeon (# [phone]), Dr Akilah English (# [phone])
- [place] [facility] [place]  - [place]cologist at [facility]: Dr Ole Mckeon (# [phone]), Dr Akilah English (# [phone])
- receives [place] at [facility] [place]  - Oncologist at [facility] [place]: Dr Ole Mckeon (# [phone]), Dr Akilah English (# [phone])
- [place] at [facility] [place]  - [place]cologist at [facility]: Dr Ole Mckeon (# [phone]), Dr Akilah English (# [phone])
- [place] [facility] [place]  - [place]cologist at [facility] [place]: Dr Ole Mckeon (# [phone]), Dr Akilah English (# [phone])

## 2018-02-09 NOTE — PROGRESS NOTE ADULT - PROVIDER SPECIALTY LIST ADULT
Internal Medicine
Nutrition Support
Infectious Disease
Internal Medicine
Internal Medicine

## 2018-02-09 NOTE — PROGRESS NOTE ADULT - SUBJECTIVE AND OBJECTIVE BOX
64 yo F w/ PMH of Colon cancer on [place] [facility] [place] and recently [place] intrahepatic arterial port for 5FU Tx p/w N/V/D after her last chemtherapy session on Wednesday. Patient has had similar symptoms after chemo in past but the recent arterial port concerned her. Denies any F/C/SOB/AP or other symptoms.    PMH:  Colon Ca stage 4 w/ liver mets    PSH:  Hemicolectomy    Allergies:  morphine (Hives)  sulfonamides (Other)    MEDICATIONS  (STANDING):  enoxaparin Injectable 40 milliGRAM(s) SubCutaneous daily  fluconAZOLE IVPB      fluconAZOLE IVPB 100 milliGRAM(s) IV Intermittent every 24 hours  loperamide 2 milliGRAM(s) Oral every 4 hours  pantoprazole  Injectable 40 milliGRAM(s) IV Push daily  Parenteral Nutrition - Adult 1 Each (75 mL/Hr) TPN Continuous <Continuous>  simethicone 80 milliGRAM(s) Chew every 6 hours    MEDICATIONS  (PRN):  acetaminophen   Tablet 650 milliGRAM(s) Oral every 4 hours PRN For Temp greater than 38 C (100.4 F)  acetaminophen  Suppository 650 milliGRAM(s) Rectal every 6 hours PRN For Temp greater than 38.5 C (101.3 F)  metoclopramide Injectable 10 milliGRAM(s) IV Push every 6 hours PRN nausea      Labs:        02-08    128<L>  |  99  |  7<L>  ----------------------------<  95  4.9   |  23  |  0.3<L>    Ca    7.7<L>      08 Feb 2018 05:26  Phos  3.9     02-08  Mg     2.5     02-08        Microbiology:      Vital Signs Last 24 Hrs  T(C): 36.3 (09 Feb 2018 06:40), Max: 36.3 (09 Feb 2018 06:40)  T(F): 97.4 (09 Feb 2018 06:40), Max: 97.4 (09 Feb 2018 06:40)  HR: 87 (09 Feb 2018 06:40) (87 - 100)  BP: 101/58 (09 Feb 2018 06:40) (101/58 - 110/63)  BP(mean): --  RR: 20 (09 Feb 2018 06:40) (18 - 20)  SpO2: --    I&O's Summary      PHYSICAL EXAM:  Constitutional: comfortable, NAD  HEENT: oral lesions have resolved  Respiratory: lungs CTAB, no w/r/r  Cardiovascular: RRR, no m/r/g  Gastrointestinal: NTND, hepatic port palpable      IMAGING:    CT ABD/PEL (1/28):  Circumferential distal wall bowel wall thickening and surrounding reactive change most pronounced in the right lower quadrant consistent with enteritis.  Remainder of the incidental findings as above.

## 2018-02-09 NOTE — PROGRESS NOTE ADULT - PROBLEM SELECTOR PLAN 3
- oral lesions have resolved.  - c/w Fluconazole 100 QD for 10 more days (end Feb 17)  - ID on board - Rx c/w Fluconazole, Tamiflu - oral lesions have resolved.  - c/w Fluconazole 100 QD PO (end Feb 17)  - ID on board - Rx c/w Fluconazole

## 2018-02-09 NOTE — PROGRESS NOTE ADULT - PROBLEM SELECTOR PROBLEM 2
Candida, oral
Chemotherapy-induced enteritis

## 2018-02-09 NOTE — CHART NOTE - NSCHARTNOTEFT_GEN_A_CORE
Pt. reports no further vomiting once tamiflu stopped yesterday. TPN tapered and d/c'd this morning.  Reports eating better. BM's much improved since admission but continues with chronic loose stool. D/C home planned today.     Vital Signs Last 24 Hrs  T(F): 97.4 (09 Feb 2018 06:40), Max: 97.4 (09 Feb 2018 06:40)  HR: 87 (09 Feb 2018 06:40) (87 - 100)  BP: 101/58 (09 Feb 2018 06:40) (101/58 - 110/63)  RR: 20 (09 Feb 2018 06:40) (18 - 20)     IV ACCESS: Right Port    MEDICATIONS  (STANDING):  enoxaparin Injectable 40 milliGRAM(s) SubCutaneous daily  fluconAZOLE IVPB 100 milliGRAM(s) IV Intermittent every 24 hours   fluconAZOLE IVPB 100 milliGRAM(s) IV Intermittent once  loperamide 2 milliGRAM(s) Oral every 4 hours  pantoprazole    Tablet 40 milliGRAM(s) Oral before breakfast  Parenteral Nutrition - Adult 1 Each (75 mL/Hr) TPN Continuous <Continuous>  rice-based Oral Electrolyte Powder 1 Packet(s) Oral once  simethicone 80 milliGRAM(s) Chew every 6 hours    MEDICATIONS  (PRN):  acetaminophen   Tablet 650 milliGRAM(s) Oral every 4 hours PRN For Temp greater than 38 C (100.4 F)  acetaminophen  Suppository 650 milliGRAM(s) Rectal every 6 hours PRN For Temp greater than 38.5 C (101.3 F)  metoclopramide Injectable 10 milliGRAM(s) IV Push every 6 hours PRN nausea    Allergies  morphine (Hives)  sulfonamides (Other)    LABS:    02-09    132<L>  |  103  |  10  ----------------------------<  93  4.8   |  25  |  0.5<L>    Ca    8.0<L>      09 Feb 2018 07:37  Phos  3.9     02-09  Mg     2.0     02-09    Height (cm): 162.56 (02-03-18 @ 12:42)  Weight (kg): 119 (02-03-18 @ 12:42)  BMI (kg/m2): 45 (02-03-18 @ 12:42)  BSA (m2): 2.2 (02-03-18 @ 12:42)     A/P:  1. TPN tapered and D/C'd today  2. Continue PO diet as tolerated  3. Hydration and if worsening diarrhea as on admission then Ceralyte 1 packet prn  4. F/U with Dr. Carroll as an outpt prn

## 2018-02-09 NOTE — PROGRESS NOTE ADULT - SUBJECTIVE AND OBJECTIVE BOX
SHANI VACA  63y  Female    INTERVAL EVENTS: pt feels much better after stopping tamiflu.  Pt feels ready to go home.  Pt has call into to [facility] and will f/u there no later than Monday.    T(F): 97.4 (02-09-18 @ 06:40), Max: 97.4 (02-09-18 @ 06:40)  HR: 87 (02-09-18 @ 06:40) (87 - 100)  BP: 101/58 (02-09-18 @ 06:40) (101/58 - 110/63)  RR: 20 (02-09-18 @ 06:40) (18 - 20)  SpO2: --          PHYSICAL EXAM:  GENERAL: no distress,  NECK: Supple, No JVD, Normal thyroid  CHEST/LUNG: Clear; No crackles or wheezing  HEART: S1, S2, Regular rate and rhythm;   ABDOMEN: Soft, Nontender, Nondistended; Bowel sounds present  EXTREMITIES: No clubbing, cyanosis, or edema  SKIN: No rashes or lesions    LABS:    RADIOLOGY & ADDITIONAL TESTS:    acetaminophen   Tablet 650 milliGRAM(s) Oral every 4 hours PRN  acetaminophen  Suppository 650 milliGRAM(s) Rectal every 6 hours PRN  enoxaparin Injectable 40 milliGRAM(s) SubCutaneous daily  fluconAZOLE IVPB 100 milliGRAM(s) IV Intermittent every 24 hours  fluconAZOLE IVPB      fluconAZOLE IVPB 100 milliGRAM(s) IV Intermittent once  loperamide 2 milliGRAM(s) Oral every 4 hours  metoclopramide Injectable 10 milliGRAM(s) IV Push every 6 hours PRN  pantoprazole    Tablet 40 milliGRAM(s) Oral before breakfast  Parenteral Nutrition - Adult 1 Each TPN Continuous <Continuous>  rice-based Oral Electrolyte Powder 1 Packet(s) Oral once  simethicone 80 milliGRAM(s) Chew every 6 hours

## 2018-02-10 LAB
CULTURE RESULTS: SIGNIFICANT CHANGE UP
SPECIMEN SOURCE: SIGNIFICANT CHANGE UP

## 2018-02-13 DIAGNOSIS — B37.0 CANDIDAL STOMATITIS: ICD-10-CM

## 2018-02-13 DIAGNOSIS — A41.9 SEPSIS, UNSPECIFIED ORGANISM: ICD-10-CM

## 2018-02-13 DIAGNOSIS — Z88.2 ALLERGY STATUS TO SULFONAMIDES: ICD-10-CM

## 2018-02-13 DIAGNOSIS — C78.00 SECONDARY MALIGNANT NEOPLASM OF UNSPECIFIED LUNG: ICD-10-CM

## 2018-02-13 DIAGNOSIS — D70.2 OTHER DRUG-INDUCED AGRANULOCYTOSIS: ICD-10-CM

## 2018-02-13 DIAGNOSIS — J10.1 INFLUENZA DUE TO OTHER IDENTIFIED INFLUENZA VIRUS WITH OTHER RESPIRATORY MANIFESTATIONS: ICD-10-CM

## 2018-02-13 DIAGNOSIS — Z96.89 PRESENCE OF OTHER SPECIFIED FUNCTIONAL IMPLANTS: ICD-10-CM

## 2018-02-13 DIAGNOSIS — T45.1X5A ADVERSE EFFECT OF ANTINEOPLASTIC AND IMMUNOSUPPRESSIVE DRUGS, INITIAL ENCOUNTER: ICD-10-CM

## 2018-02-13 DIAGNOSIS — Z90.49 ACQUIRED ABSENCE OF OTHER SPECIFIED PARTS OF DIGESTIVE TRACT: ICD-10-CM

## 2018-02-13 DIAGNOSIS — K52.1 TOXIC GASTROENTERITIS AND COLITIS: ICD-10-CM

## 2018-02-13 DIAGNOSIS — K52.9 NONINFECTIVE GASTROENTERITIS AND COLITIS, UNSPECIFIED: ICD-10-CM

## 2018-02-13 DIAGNOSIS — C78.7 SECONDARY MALIGNANT NEOPLASM OF LIVER AND INTRAHEPATIC BILE DUCT: ICD-10-CM

## 2018-02-13 DIAGNOSIS — C18.9 MALIGNANT NEOPLASM OF COLON, UNSPECIFIED: ICD-10-CM

## 2018-02-13 DIAGNOSIS — E87.6 HYPOKALEMIA: ICD-10-CM

## 2018-02-13 DIAGNOSIS — K12.31 ORAL MUCOSITIS (ULCERATIVE) DUE TO ANTINEOPLASTIC THERAPY: ICD-10-CM

## 2018-02-13 DIAGNOSIS — E86.0 DEHYDRATION: ICD-10-CM

## 2020-12-30 PROBLEM — Z00.00 ENCOUNTER FOR PREVENTIVE HEALTH EXAMINATION: Status: ACTIVE | Noted: 2020-12-30

## 2021-01-21 ENCOUNTER — APPOINTMENT (OUTPATIENT)
Dept: OTOLARYNGOLOGY | Facility: CLINIC | Age: 56
End: 2021-01-21
Payer: COMMERCIAL

## 2021-01-21 VITALS — HEIGHT: 64 IN | WEIGHT: 124 LBS | TEMPERATURE: 98.1 F | BODY MASS INDEX: 21.17 KG/M2

## 2021-01-21 DIAGNOSIS — H69.81 OTHER SPECIFIED DISORDERS OF EUSTACHIAN TUBE, RIGHT EAR: ICD-10-CM

## 2021-01-21 DIAGNOSIS — Z87.39 PERSONAL HISTORY OF OTHER DISEASES OF THE MUSCULOSKELETAL SYSTEM AND CONNECTIVE TISSUE: ICD-10-CM

## 2021-01-21 DIAGNOSIS — R73.03 PREDIABETES.: ICD-10-CM

## 2021-01-21 DIAGNOSIS — Z80.3 FAMILY HISTORY OF MALIGNANT NEOPLASM OF BREAST: ICD-10-CM

## 2021-01-21 DIAGNOSIS — Z83.3 FAMILY HISTORY OF DIABETES MELLITUS: ICD-10-CM

## 2021-01-21 DIAGNOSIS — Z87.09 PERSONAL HISTORY OF OTHER DISEASES OF THE RESPIRATORY SYSTEM: ICD-10-CM

## 2021-01-21 DIAGNOSIS — Z82.49 FAMILY HISTORY OF ISCHEMIC HEART DISEASE AND OTHER DISEASES OF THE CIRCULATORY SYSTEM: ICD-10-CM

## 2021-01-21 DIAGNOSIS — J31.0 CHRONIC RHINITIS: ICD-10-CM

## 2021-01-21 PROCEDURE — 99072 ADDL SUPL MATRL&STAF TM PHE: CPT

## 2021-01-21 PROCEDURE — 92567 TYMPANOMETRY: CPT

## 2021-01-21 PROCEDURE — 92557 COMPREHENSIVE HEARING TEST: CPT

## 2021-01-21 PROCEDURE — 99204 OFFICE O/P NEW MOD 45 MIN: CPT

## 2021-01-21 RX ORDER — BACLOFEN 15 MG/1
TABLET ORAL
Refills: 0 | Status: ACTIVE | COMMUNITY

## 2021-01-21 RX ORDER — ATORVASTATIN CALCIUM 80 MG/1
TABLET, FILM COATED ORAL
Refills: 0 | Status: ACTIVE | COMMUNITY

## 2021-01-27 PROBLEM — J31.0 RHINITIS: Status: ACTIVE | Noted: 2021-01-27

## 2021-01-27 PROBLEM — H69.81 DYSFUNCTION OF RIGHT EUSTACHIAN TUBE: Status: ACTIVE | Noted: 2021-01-27

## 2021-01-27 NOTE — ASSESSMENT
[FreeTextEntry1] : Clinically very  stable.\par I reassured her that the short acting tube is in place and should fall out spontaneously in the next 3-6 months.\par I would not recommend removing it at this time.\par continue with topica nasal tx and fu prn.

## 2021-01-27 NOTE — HISTORY OF PRESENT ILLNESS
[de-identified] : Initial visit.\par In November 2020 she developed spontaneous right sided ear pain.\par She had bilateral ear fullness.\par She was initially treated with supportive care including decongestants for ETD and a viral syndrome by another ENT.\par \par Progressed to significant hearing loss on the right.\par She was treated with continued topical tx and abx and oral steroids.\par On fu she had a myringotomy and tube placed eventually after about 2 months of symptoms  and had significant improvement.\par Imaging was considered but deferred.\par \par \par \par

## 2021-01-27 NOTE — PROCEDURE
[FreeTextEntry6] : PROCEDURE NOTES\par \par PROCEDURE: Nasal endoscopy \par SURGEON: Dr. Bowles\par INDICATIONS: Assess for chronic sinusitis. \par ANESTHESIA: The patient was placed in a sitting position.  Following application of the topical anesthetic and decongestant, exam was performed with a zero degree endoscope.  The scope was passed along the right nasal floor to the nasopharynx.  It was then passed into the region of the middle meatus, middle turbinate, and sphenoethmoid region.  An identical procedure was performed on the left side.  The following findings were noted:\par \par The nasal mucosa was healthy appearing and the septum was roughly midline. The middle meatus and sphenoethmoid recesses were clear bilaterally. The nasopharynx was normal. \par

## 2022-08-16 ENCOUNTER — APPOINTMENT (OUTPATIENT)
Dept: OTOLARYNGOLOGY | Facility: CLINIC | Age: 57
End: 2022-08-16

## 2022-08-16 VITALS — HEIGHT: 64 IN | WEIGHT: 113 LBS | BODY MASS INDEX: 19.29 KG/M2

## 2022-08-16 PROCEDURE — 99212 OFFICE O/P EST SF 10 MIN: CPT | Mod: 95

## 2022-08-16 RX ORDER — DOXYCYCLINE HYCLATE 100 MG/1
100 TABLET ORAL
Qty: 60 | Refills: 0 | Status: DISCONTINUED | COMMUNITY
Start: 2022-06-15

## 2022-08-16 RX ORDER — NITROFURANTOIN (MONOHYDRATE/MACROCRYSTALS) 25; 75 MG/1; MG/1
100 CAPSULE ORAL
Qty: 10 | Refills: 0 | Status: DISCONTINUED | COMMUNITY
Start: 2022-02-24

## 2022-08-16 RX ORDER — TOBRAMYCIN AND DEXAMETHASONE 3; 1 MG/ML; MG/ML
0.3-0.1 SUSPENSION/ DROPS OPHTHALMIC
Qty: 10 | Refills: 0 | Status: ACTIVE | COMMUNITY
Start: 2022-08-11

## 2022-08-16 RX ORDER — HYDROCORTISONE 25 MG/ML
2.5 LOTION TOPICAL
Qty: 59 | Refills: 0 | Status: ACTIVE | COMMUNITY
Start: 2022-07-26

## 2022-08-16 RX ORDER — AZELASTINE HYDROCHLORIDE 137 UG/1
0.1 SPRAY, METERED NASAL
Qty: 30 | Refills: 0 | Status: ACTIVE | COMMUNITY
Start: 2022-08-05

## 2022-08-16 RX ORDER — CELECOXIB 50 MG/1
50 CAPSULE ORAL
Qty: 30 | Refills: 0 | Status: DISCONTINUED | COMMUNITY
Start: 2022-08-01

## 2022-08-16 RX ORDER — ROSUVASTATIN CALCIUM 10 MG/1
10 TABLET, FILM COATED ORAL
Qty: 90 | Refills: 0 | Status: ACTIVE | COMMUNITY
Start: 2022-01-26

## 2022-08-16 RX ORDER — CICLOPIROX OLAMINE 7.7 MG/G
0.77 CREAM TOPICAL
Qty: 30 | Refills: 0 | Status: DISCONTINUED | COMMUNITY
Start: 2022-06-15

## 2022-08-16 RX ORDER — CEFUROXIME AXETIL 500 MG/1
500 TABLET ORAL
Refills: 0 | Status: ACTIVE | COMMUNITY

## 2022-08-16 RX ORDER — OMEPRAZOLE 20 MG/1
20 CAPSULE, DELAYED RELEASE ORAL
Qty: 90 | Refills: 0 | Status: DISCONTINUED | COMMUNITY
Start: 2022-06-15

## 2022-08-16 RX ORDER — OFLOXACIN OTIC 3 MG/ML
0.3 SOLUTION AURICULAR (OTIC)
Qty: 5 | Refills: 0 | Status: DISCONTINUED | COMMUNITY
Start: 2022-08-01

## 2022-08-16 RX ORDER — LEVOFLOXACIN 750 MG/1
750 TABLET, FILM COATED ORAL
Qty: 10 | Refills: 0 | Status: DISCONTINUED | COMMUNITY
Start: 2022-08-11

## 2022-08-16 RX ORDER — OMEPRAZOLE 40 MG/1
40 CAPSULE, DELAYED RELEASE ORAL
Qty: 90 | Refills: 0 | Status: DISCONTINUED | COMMUNITY
Start: 2022-04-21

## 2022-08-16 RX ORDER — FLUCONAZOLE 150 MG/1
150 TABLET ORAL
Qty: 2 | Refills: 0 | Status: DISCONTINUED | COMMUNITY
Start: 2022-07-28

## 2022-08-16 RX ORDER — CEFDINIR 300 MG/1
300 CAPSULE ORAL
Qty: 20 | Refills: 0 | Status: DISCONTINUED | COMMUNITY
Start: 2022-08-04

## 2022-08-16 RX ORDER — PHENAZOPYRIDINE HYDROCHLORIDE 200 MG/1
200 TABLET ORAL
Qty: 6 | Refills: 0 | Status: DISCONTINUED | COMMUNITY
Start: 2022-02-24

## 2022-08-16 RX ORDER — KETOCONAZOLE 20 MG/G
2 CREAM TOPICAL
Qty: 60 | Refills: 0 | Status: DISCONTINUED | COMMUNITY
Start: 2022-07-26

## 2022-08-16 RX ORDER — DOXYCYCLINE HYCLATE 100 MG/1
100 CAPSULE ORAL
Qty: 20 | Refills: 0 | Status: DISCONTINUED | COMMUNITY
Start: 2022-07-28

## 2022-08-16 RX ORDER — CLOBETASOL PROPIONATE 0.5 MG/ML
0.05 SOLUTION TOPICAL
Qty: 50 | Refills: 0 | Status: DISCONTINUED | COMMUNITY
Start: 2022-06-15

## 2022-08-16 NOTE — ASSESSMENT
[FreeTextEntry1] : History is consistent with chronic suppurative otitis media in the right ear.  Cholesteatoma is suspected.  Possibility of CSF otorrhea.  I have discussed this with the patient.\par \par I have recommended in person visit for examination.  I have requested an opportunity to review actual CT images.\par \par She agreed with this plan.

## 2022-08-16 NOTE — DATA REVIEWED
[de-identified] : Audiometry from 2021 reviewed [de-identified] : CT scan report reviewed.  Images not available
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
## 2022-08-29 ENCOUNTER — APPOINTMENT (OUTPATIENT)
Dept: OTOLARYNGOLOGY | Facility: CLINIC | Age: 57
End: 2022-08-29

## 2022-08-29 ENCOUNTER — TRANSCRIPTION ENCOUNTER (OUTPATIENT)
Age: 57
End: 2022-08-29

## 2022-08-29 VITALS — TEMPERATURE: 97.3 F | BODY MASS INDEX: 19.29 KG/M2 | WEIGHT: 113 LBS | HEIGHT: 64 IN

## 2022-08-29 DIAGNOSIS — H90.11 CONDUCTIVE HEARING LOSS, UNILATERAL, RIGHT EAR, WITH UNRESTRICTED HEARING ON THE CONTRALATERAL SIDE: ICD-10-CM

## 2022-08-29 DIAGNOSIS — H66.3X1 OTHER CHRONIC SUPPURATIVE OTITIS MEDIA, RIGHT EAR: ICD-10-CM

## 2022-08-29 DIAGNOSIS — H70.11 CHRONIC MASTOIDITIS, RIGHT EAR: ICD-10-CM

## 2022-08-29 DIAGNOSIS — H93.299 OTHER ABNORMAL AUDITORY PERCEPTIONS, UNSPECIFIED EAR: ICD-10-CM

## 2022-08-29 PROCEDURE — 99214 OFFICE O/P EST MOD 30 MIN: CPT

## 2022-08-29 PROCEDURE — 92550 TYMPANOMETRY & REFLEX THRESH: CPT

## 2022-08-29 PROCEDURE — 92504 EAR MICROSCOPY EXAMINATION: CPT

## 2022-08-29 PROCEDURE — 92557 COMPREHENSIVE HEARING TEST: CPT

## 2022-08-29 NOTE — PHYSICAL EXAM
[Normal] : mucosa is normal [Midline] : trachea located in midline position [FreeTextEntry1] : Procedure: Microscopic Ear Exam\par \par Left ear:  \par Ear canal intact without inflammation or lesion.  \par Tympanic membrane intact without inflammation.\par \par \par Right ear:  \par Ear canal intact without inflammation or lesion.  Inferior ventilation tube in place and patent.  Otherwise normal-appearing tympanic membrane with no visible middle ear process or effusion.

## 2022-08-29 NOTE — ASSESSMENT
[FreeTextEntry1] : Intermittent otorrhea from the right ear with CT evidence of opacity.  No definitive evidence of tegmen defect.  Today's evaluation reveals essentially healthy tympanic membrane with a ventilation tube in place.\par \par I have recommended observation.  Recurrent symptoms may warrant further intervention.  Dry ear precautions recommended.  Otic drops recommended if otorrhea recurs.\par \par Follow-up recommended

## 2022-08-29 NOTE — DATA REVIEWED
[de-identified] : In light of the patients current symptoms, Complete audiometry was ordered and completed today. I have interpreted these results and reviewed them in detail with the patient.\par Mild low-frequency conductive loss in the right ear\par \par  [de-identified] : CT scan including images reviewed in detail.  Soft tissue affecting the right middle ear with scattered mastoid opacity
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
## 2022-11-28 ENCOUNTER — APPOINTMENT (OUTPATIENT)
Dept: OTOLARYNGOLOGY | Facility: CLINIC | Age: 57
End: 2022-11-28

## 2022-11-30 ENCOUNTER — APPOINTMENT (OUTPATIENT)
Dept: OTOLARYNGOLOGY | Facility: CLINIC | Age: 57
End: 2022-11-30

## 2025-05-28 ENCOUNTER — APPOINTMENT (OUTPATIENT)
Dept: PAIN MANAGEMENT | Facility: CLINIC | Age: 60
End: 2025-05-28